# Patient Record
Sex: FEMALE | Race: WHITE | Employment: OTHER | URBAN - METROPOLITAN AREA
[De-identification: names, ages, dates, MRNs, and addresses within clinical notes are randomized per-mention and may not be internally consistent; named-entity substitution may affect disease eponyms.]

---

## 2017-02-23 ENCOUNTER — OFFICE VISIT (OUTPATIENT)
Dept: INTERNAL MEDICINE CLINIC | Age: 72
End: 2017-02-23

## 2017-02-23 VITALS
OXYGEN SATURATION: 98 % | HEIGHT: 59 IN | DIASTOLIC BLOOD PRESSURE: 69 MMHG | RESPIRATION RATE: 16 BRPM | TEMPERATURE: 98.1 F | SYSTOLIC BLOOD PRESSURE: 144 MMHG | WEIGHT: 179 LBS | BODY MASS INDEX: 36.08 KG/M2

## 2017-02-23 DIAGNOSIS — J40 BRONCHITIS: Primary | ICD-10-CM

## 2017-02-23 DIAGNOSIS — R05.9 COUGH: ICD-10-CM

## 2017-02-23 LAB
BASOPHILS # BLD AUTO: 0 X10E3/UL (ref 0–0.2)
BASOPHILS NFR BLD AUTO: 0 %
EOSINOPHIL # BLD AUTO: 0.1 X10E3/UL (ref 0–0.4)
EOSINOPHIL NFR BLD AUTO: 2 %
ERYTHROCYTE [DISTWIDTH] IN BLOOD BY AUTOMATED COUNT: 14.1 % (ref 12.3–15.4)
HCT VFR BLD AUTO: 41.7 % (ref 34–46.6)
HGB BLD-MCNC: 14.7 G/DL (ref 11.1–15.9)
LYMPHOCYTES # BLD AUTO: 1.7 X10E3/UL (ref 0.7–3.1)
LYMPHOCYTES NFR BLD AUTO: 29 %
MCH RBC QN AUTO: 30.2 PG (ref 26.6–33)
MCHC RBC AUTO-ENTMCNC: 35.3 G/DL (ref 31.5–35.7)
MCV RBC AUTO: 86 FL (ref 79–97)
MONOCYTES # BLD AUTO: 0.8 X10E3/UL (ref 0.1–0.9)
MONOCYTES NFR BLD AUTO: 14 %
NEUTROPHILS # BLD AUTO: 3.1 X10E3/UL (ref 1.4–7)
NEUTROPHILS NFR BLD AUTO: 55 %
PLATELET # BLD AUTO: 149 X10E3/UL (ref 150–379)
QUICKVUE INFLUENZA TEST: NEGATIVE
RBC # BLD AUTO: 4.86 X10E6/UL (ref 3.77–5.28)
VALID INTERNAL CONTROL?: YES
WBC # BLD AUTO: 5.7 X10E3/UL (ref 3.4–10.8)

## 2017-02-23 RX ORDER — PREDNISONE 20 MG/1
TABLET ORAL
Qty: 10 TAB | Refills: 0 | Status: SHIPPED | OUTPATIENT
Start: 2017-02-23 | End: 2018-01-09 | Stop reason: ALTCHOICE

## 2017-02-23 RX ORDER — PREDNISONE 20 MG/1
TABLET ORAL
Qty: 10 TAB | Refills: 0 | Status: SHIPPED | OUTPATIENT
Start: 2017-02-23 | End: 2017-02-23

## 2017-02-23 NOTE — PROGRESS NOTES
Subjective:   Lazaro Somers is a 73 y.o. female who complains of congestion, sore throat, productive cough and chills for several days, unchanged since that time. She denies a history of shortness of breath and wheezing. Patient reports her  was sick with similar symptoms. No fever but has had some chills at times. Throat is scratchy. She thought maybe was her sinus at first.    Evaluation to date: none. Treatment to date: mucinex yesterday. Patient does not smoke cigarettes. Relevant PMH: No pertinent additional PMH. Patient Active Problem List    Diagnosis Date Noted    Left knee DJD 05/16/2016    Localized primary osteoarthritis of left lower leg 05/16/2016    Seasonal allergic rhinitis 04/05/2012    GERD (gastroesophageal reflux disease) 04/05/2012     Allergies   Allergen Reactions    Azithromycin Nausea and Vomiting    Codeine Itching    Demerol [Meperidine] Itching     hallucinations    Dilaudid [Hydromorphone] Other (comments)     Constipation/depression    Pcn [Penicillins] Itching and Nausea Only     Pt stated she took keflex in 1970's without any reactions    Percocet [Oxycodone-Acetaminophen] Itching     Pt willing to try hydrocodone    Percodan [Oxycodone Hcl-Oxycodone-Asa] Itching        Review of Systems  Pertinent items are noted in HPI. Objective:     Visit Vitals    /69    Temp 98.1 °F (36.7 °C) (Oral)    Resp 16    Ht 4' 11\" (1.499 m)    Wt 179 lb (81.2 kg)    SpO2 98%    BMI 36.15 kg/m2     General:  alert, cooperative, no distress   Eyes: negative   Ears: abnormal TM AD - bulging, abnormal TM AS - bulging   Sinuses: Normal paranasal sinuses without tenderness   Mouth:  abnormal findings: mild oropharyngeal erythema   Neck: no adenopathy. Heart: normal rate and regular rhythm, no murmurs noted. Lungs: clear to auscultation bilaterally   Abdomen: Not examined        Assessment/Plan:   viral upper respiratory illness  RTC prn.   Tai Dietrich was seen today for illness. Diagnoses and all orders for this visit:    Bronchitis  -     CBC WITH AUTOMATED DIFF  -     Discontinue: predniSONE (DELTASONE) 20 mg tablet; Take two tablets daily for 5 days  -     predniSONE (DELTASONE) 20 mg tablet; Take two tablets daily for 5 days    Cough  -     AMB POC RAPID INFLUENZA TEST    .patient to get a lab done. Will contact her with the results once back. Rest and fluids. Take medication as prescribed.

## 2017-02-27 ENCOUNTER — HOSPITAL ENCOUNTER (OUTPATIENT)
Dept: GENERAL RADIOLOGY | Age: 72
Discharge: HOME | End: 2017-02-27
Attending: PHYSICIAN ASSISTANT
Payer: COMMERCIAL

## 2017-02-27 ENCOUNTER — OFFICE VISIT (OUTPATIENT)
Dept: INTERNAL MEDICINE CLINIC | Age: 72
End: 2017-02-27

## 2017-02-27 VITALS
SYSTOLIC BLOOD PRESSURE: 134 MMHG | DIASTOLIC BLOOD PRESSURE: 57 MMHG | BODY MASS INDEX: 36.29 KG/M2 | RESPIRATION RATE: 20 BRPM | HEART RATE: 91 BPM | TEMPERATURE: 98.1 F | WEIGHT: 180 LBS | HEIGHT: 59 IN | OXYGEN SATURATION: 95 %

## 2017-02-27 DIAGNOSIS — R05.9 COUGH: Primary | ICD-10-CM

## 2017-02-27 DIAGNOSIS — R05.9 COUGH: ICD-10-CM

## 2017-02-27 PROCEDURE — 71020 XR CHEST PA LAT: CPT

## 2017-02-27 RX ORDER — OXYBUTYNIN CHLORIDE 5 MG/1
TABLET, EXTENDED RELEASE ORAL
COMMUNITY
Start: 2017-02-27 | End: 2018-01-09 | Stop reason: ALTCHOICE

## 2017-02-27 RX ORDER — LEVOFLOXACIN 500 MG/1
500 TABLET, FILM COATED ORAL DAILY
Qty: 10 TAB | Refills: 0 | Status: SHIPPED | OUTPATIENT
Start: 2017-02-27 | End: 2018-01-09 | Stop reason: ALTCHOICE

## 2017-02-27 RX ORDER — ALBUTEROL SULFATE 90 UG/1
1 AEROSOL, METERED RESPIRATORY (INHALATION)
Qty: 1 INHALER | Refills: 0 | Status: SHIPPED | OUTPATIENT
Start: 2017-02-27 | End: 2019-11-11 | Stop reason: ALTCHOICE

## 2017-02-27 RX ORDER — CHOLESTYRAMINE 4 G/9G
POWDER, FOR SUSPENSION ORAL
COMMUNITY
Start: 2017-02-26 | End: 2017-02-27 | Stop reason: SDUPTHER

## 2017-02-27 NOTE — LETTER
NOTIFICATION RETURN TO WORK / SCHOOL 
 
2/27/2017 2:06 PM 
 
Ms. Celeste Gallegos [address] [facility] [id number] To Whom It May Concern: 
 
Celeste Gallegos is currently under the care of [facility].. She will return to work/school on: March 1, 2017 If there are questions or concerns please have the patient contact our office.  
 
 
 
Sincerely, 
 
 
Fran Sinclair PA-C

## 2017-02-27 NOTE — PROGRESS NOTES
Please let the patient know chest xray is normal. Finish medication and if not better follow up with Dr. Srinivasa Purdy.  thanks

## 2017-02-27 NOTE — PROGRESS NOTES
Writer called patient to inform of Lab and Chest xray results per Sophy with instruction, patient verbalized understanding.

## 2017-02-27 NOTE — PATIENT INSTRUCTIONS
Cardiac Guard Activation    Thank you for requesting access to [facility]iac Guard. Please follow the instructions below to securely access and download your online medical record. [facility]iac Guard allows you to send messages to your doctor, view your test results, renew your prescriptions, schedule appointments, and more. How Do I Sign Up? 1. In your internet browser, go to [email or web address]  2. Click on the First Time User? Click Here link in the Sign In box. You will be redirect to the New Member Sign Up page. 3. Enter your Cardiac Guard Access Code exactly as it appears below. You will not need to use this code after youve completed the sign-up process. If you do not sign up before the expiration date, you must request a new code. Cardiac Guard Access Code: [email or web address]  Expires: 3/13/2017 11:45 AM (This is the date your Cardiac Guard access code will )    4. Enter the last four digits of your Social Security Number (xxxx) and Date of Birth (mm/dd/yyyy) as indicated and click Submit. You will be taken to the next sign-up page. 5. Create a Cardiac Guard ID. This will be your Cardiac Guard login ID and cannot be changed, so think of one that is secure and easy to remember. 6. Create a Cardiac Guard password. You can change your password at any time. 7. Enter your Password Reset Question and Answer. This can be used at a later time if you forget your password. 8. Enter your e-mail address. You will receive e-mail notification when new information is available in [id number]. 9. Click Sign Up. You can now view and download portions of your medical record. 10. Click the Download Summary menu link to download a portable copy of your medical information. Additional Information    If you have questions, please visit the Frequently Asked Questions section of the Cardiac Guard website at [email or web address] Remember, Cardiac Guard is NOT to be used for urgent needs. For medical emergencies, dial 911.

## 2017-02-27 NOTE — PROGRESS NOTES
Subjective:   Marcellus Forman is a 73 y.o. female who complains of productive cough and fever(?) for several days, unchanged since that time. She was seen here last week and was prescribed prednisone for bronchitis. She reports she took it for 2 days but stopped it because it was making her feel jittery and she could not sleep. Since then she has continued to cough. She reports the mucous is white and thick. Coughing makes her chest hurt. She believes she had a fever this weekend. Patient Active Problem List    Diagnosis Date Noted    Left knee DJD 05/16/2016    Localized primary osteoarthritis of left lower leg 05/16/2016    Seasonal allergic rhinitis 04/05/2012    GERD (gastroesophageal reflux disease) 04/05/2012     Allergies   Allergen Reactions    Azithromycin Nausea and Vomiting    Codeine Itching    Demerol [Meperidine] Itching     hallucinations    Dilaudid [Hydromorphone] Other (comments)     Constipation/depression    Pcn [Penicillins] Itching and Nausea Only     Pt stated she took keflex in 1970's without any reactions    Percocet [Oxycodone-Acetaminophen] Itching     Pt willing to try hydrocodone    Percodan [Oxycodone Hcl-Oxycodone-Asa] Itching        Review of Systems  Pertinent items are noted in HPI. Objective:     Visit Vitals    /57    Pulse 91    Temp 98.1 °F (36.7 °C) (Oral)    Resp 20    Ht 4' 11\" (1.499 m)    Wt 180 lb (81.6 kg)    SpO2 95%    BMI 36.36 kg/m2     General:  alert, cooperative, no distress   Eyes: negative   Ears: normal TM's and external ear canals AU   Sinuses: Normal paranasal sinuses without tenderness   Mouth:  abnormal findings: mild oropharyngeal erythema   Neck: no adenopathy. Heart: normal rate and regular rhythm, no murmurs noted. Lungs: Coarse breath sounds with mild wheeze   Abdomen: Not examined        Assessment/Plan:   bronchitis  Antibiotics per orders. RTC prn. Christian Barrera was seen today for cold symptoms.     Diagnoses and all orders for this visit:    Cough  -     levoFLOXacin (LEVAQUIN) 500 mg tablet; Take 1 Tab by mouth daily. -     XR CHEST PA LAT; Future  -     albuterol (PROVENTIL HFA, VENTOLIN HFA, PROAIR HFA) 90 mcg/actuation inhaler; Take 1 Puff by inhalation every six (6) hours as needed for Wheezing. .patient to take medication as prescribed. I would like for her to get a chest xray. She will need to see Dr. Barry Hernandez if not better.

## 2017-02-27 NOTE — PROGRESS NOTES
Reviewed record in preparation for visit and have obtained necessary documentation. Identified pt with two pt identifiers(name and ). Health Maintenance Due   Topic    Hepatitis C Screening     DTaP/Tdap/Td series (1 - Tdap)    BREAST CANCER SCRN MAMMOGRAM     ZOSTER VACCINE AGE 60>     GLAUCOMA SCREENING Q2Y     OSTEOPOROSIS SCREENING (DEXA)     Pneumococcal 65+ Low/Medium Risk (1 of 2 - PCV13)    MEDICARE YEARLY EXAM     INFLUENZA AGE 9 TO ADULT          Chief Complaint   Patient presents with    Cold Symptoms     1 week, Pain in chest when coughing, cough/white mucous, headache, sinus pressure, nasal/white, sore throat, feel run down,         Wt Readings from Last 3 Encounters:   17 180 lb (81.6 kg)   17 179 lb (81.2 kg)   16 180 lb (81.6 kg)     Temp Readings from Last 3 Encounters:   17 98.1 °F (36.7 °C) (Oral)   17 98.1 °F (36.7 °C) (Oral)   16 98.2 °F (36.8 °C) (Oral)     BP Readings from Last 3 Encounters:   17 134/57   17 144/69   16 138/63     Pulse Readings from Last 3 Encounters:   17 91   16 87   16 87           Learning Assessment:  :     Learning Assessment 4/15/2016   PRIMARY LEARNER Patient   HIGHEST LEVEL OF EDUCATION - PRIMARY LEARNER  GRADUATED HIGH SCHOOL OR GED   BARRIERS PRIMARY LEARNER NONE   CO-LEARNER CAREGIVER No   PRIMARY LANGUAGE ENGLISH    NEED No   LEARNER PREFERENCE PRIMARY DEMONSTRATION   ANSWERED BY patient   RELATIONSHIP SELF       Depression Screening:  :     PHQ 2 / 9, over the last two weeks 2017   Little interest or pleasure in doing things Not at all   Feeling down, depressed or hopeless Not at all   Total Score PHQ 2 0       Fall Risk Assessment:  :     Fall Risk Assessment, last 12 mths 2017   Able to walk? Yes   Fall in past 12 months? No       Abuse Screening:  :     Abuse Screening Questionnaire 4/15/2016   Do you ever feel afraid of your partner?  N   Are you in a relationship with someone who physically or mentally threatens you? N   Is it safe for you to go home? Y       Coordination of Care Questionnaire:  :     1) Have you been to an emergency room, urgent care clinic since your last visit? no   Hospitalized since your last visit? no             2) Have you seen or consulted any other health care providers outside of [address] [place] [address] since your last visit? no  (Include any pap smears or colon screenings in this section.)    3) Do you have an Advance Directive on file? no    4) Are you interested in receiving information on Advance Directives? NO      Patient is accompanied by self I have received verbal consent from Beacher Dakin to discuss any/all medical information while they are present in the room.

## 2018-01-09 ENCOUNTER — OFFICE VISIT (OUTPATIENT)
Dept: INTERNAL MEDICINE CLINIC | Age: 73
End: 2018-01-09

## 2018-01-09 VITALS
SYSTOLIC BLOOD PRESSURE: 121 MMHG | WEIGHT: 182 LBS | HEART RATE: 78 BPM | OXYGEN SATURATION: 97 % | RESPIRATION RATE: 20 BRPM | HEIGHT: 59 IN | TEMPERATURE: 97.8 F | DIASTOLIC BLOOD PRESSURE: 59 MMHG | BODY MASS INDEX: 36.69 KG/M2

## 2018-01-09 DIAGNOSIS — R05.9 COUGH: Primary | ICD-10-CM

## 2018-01-09 RX ORDER — LEVOFLOXACIN 500 MG/1
500 TABLET, FILM COATED ORAL DAILY
Qty: 10 TAB | Refills: 0 | Status: SHIPPED | OUTPATIENT
Start: 2018-01-09 | End: 2019-11-11 | Stop reason: ALTCHOICE

## 2018-01-09 RX ORDER — HYDROCODONE POLISTIREX AND CHLORPHENIRAMINE POLISTIREX 10; 8 MG/5ML; MG/5ML
1 SUSPENSION, EXTENDED RELEASE ORAL
Qty: 50 ML | Refills: 0 | Status: SHIPPED | OUTPATIENT
Start: 2018-01-09 | End: 2019-11-11 | Stop reason: ALTCHOICE

## 2018-01-09 RX ORDER — MONTELUKAST SODIUM 4 MG/1
1 TABLET, CHEWABLE ORAL DAILY
COMMUNITY
Start: 2018-01-08

## 2018-01-09 RX ORDER — OXYBUTYNIN CHLORIDE 10 MG/1
TABLET, EXTENDED RELEASE ORAL
Refills: 1 | COMMUNITY
Start: 2017-12-31 | End: 2019-11-11 | Stop reason: SDUPTHER

## 2018-01-09 NOTE — PROGRESS NOTES
Reviewed record in preparation for visit and have obtained necessary documentation. Identified pt with two pt identifiers(name and ). Health Maintenance Due   Topic    Hepatitis C Screening     DTaP/Tdap/Td series (1 - Tdap)    BREAST CANCER SCRN MAMMOGRAM     ZOSTER VACCINE AGE 60>     GLAUCOMA SCREENING Q2Y     OSTEOPOROSIS SCREENING (DEXA)     Pneumococcal 65+ Low/Medium Risk (1 of 2 - PCV13)    MEDICARE YEARLY EXAM     Influenza Age 5 to Adult          No chief complaint on file. Wt Readings from Last 3 Encounters:   18 182 lb (82.6 kg)   17 180 lb (81.6 kg)   17 179 lb (81.2 kg)     Temp Readings from Last 3 Encounters:   18 97.8 °F (36.6 °C) (Oral)   17 98.1 °F (36.7 °C) (Oral)   17 98.1 °F (36.7 °C) (Oral)     BP Readings from Last 3 Encounters:   17 134/57   17 144/69   16 138/63     Pulse Readings from Last 3 Encounters:   17 91   16 87   16 87           Learning Assessment:  :     Learning Assessment 2018 2017 4/15/2016   PRIMARY LEARNER Patient Patient Patient   HIGHEST LEVEL OF EDUCATION - PRIMARY LEARNER  - - GRADUATED HIGH SCHOOL OR GED   BARRIERS PRIMARY LEARNER - - NONE   CO-LEARNER CAREGIVER - - No   PRIMARY LANGUAGE ENGLISH ENGLISH ENGLISH    NEED - - No   LEARNER PREFERENCE PRIMARY DEMONSTRATION DEMONSTRATION DEMONSTRATION   ANSWERED BY self self patient   RELATIONSHIP SELF SELF SELF       Depression Screening:  :     PHQ over the last two weeks 2017   Little interest or pleasure in doing things Not at all   Feeling down, depressed or hopeless Not at all   Total Score PHQ 2 0       Fall Risk Assessment:  :     Fall Risk Assessment, last 12 mths 2017   Able to walk? Yes   Fall in past 12 months? No       Abuse Screening:  :     Abuse Screening Questionnaire 2018 2017 4/15/2016   Do you ever feel afraid of your partner?  N N N   Are you in a relationship with someone who physically or mentally threatens you? N N N   Is it safe for you to go home? Y Y Y       Coordination of Care Questionnaire:  :     1) Have you been to an emergency room, urgent care clinic since your last visit? no   Hospitalized since your last visit? no             2) Have you seen or consulted any other health care providers outside of [address] [place] [address] since your last visit? no  (Include any pap smears or colon screenings in this section.)    3) Do you have an Advance Directive on file? no    4) Are you interested in receiving information on Advance Directives? YES      Patient is accompanied by self I have received verbal consent from Mary Alvarez to discuss any/all medical information while they are present in the room.

## 2018-01-10 NOTE — PROGRESS NOTES
Subjective:   Helene Arnold is a 81 y.o. female who complains of post nasal drip, productive cough, fever and some wheeze with fatigue for this has been going on off and on since Thanksgiving. She thought she was getting better but then she got sick again. She believes she got it from her . She reports the cough is horrible and has been keeping her up. She did take some of her 's cough medication. (guaifenesin from the [place])  She denies a history of shortness of breath. Evaluation to date: none. Treatment to date: OTC products. Patient does not smoke cigarettes. Relevant PMH: No pertinent additional PMH. Patient Active Problem List    Diagnosis Date Noted    Left knee DJD 05/16/2016    Localized primary osteoarthritis of left lower leg 05/16/2016    Seasonal allergic rhinitis 04/05/2012    GERD (gastroesophageal reflux disease) 04/05/2012     Allergies   Allergen Reactions    Azithromycin Nausea and Vomiting    Codeine Itching    Demerol [Meperidine] Itching     hallucinations    Dilaudid [Hydromorphone] Other (comments)     Constipation/depression    Pcn [Penicillins] Itching and Nausea Only     Pt stated she took keflex in 1970's without any reactions    Percocet [Oxycodone-Acetaminophen] Itching     Pt willing to try hydrocodone    Percodan [Oxycodone Hcl-Oxycodone-Asa] Itching        Review of Systems  Pertinent items are noted in HPI. Objective:     Visit Vitals    /59    Pulse 78    Temp 97.8 °F (36.6 °C) (Oral)    Resp 20    Ht 4' 11\" (1.499 m)    Wt 182 lb (82.6 kg)    SpO2 97%    BMI 36.76 kg/m2     General:  alert, cooperative, no distress   Eyes: negative   Ears: normal TM's and external ear canals AU   Sinuses: Normal paranasal sinuses without tenderness   Mouth:  abnormal findings: mild oropharyngeal erythema   Neck: no adenopathy. Heart: normal rate and regular rhythm, no murmurs noted.     Lungs: clear to auscultation bilaterally, no current wheeze Abdomen: Not examined        Assessment/Plan:   cough  Antibiotics per orders. RTC prn. Diagnoses and all orders for this visit:    1. Cough  -     chlorpheniramine-HYDROcodone (TUSSIONEX) 10-8 mg/5 mL suspension; Take 5 mL by mouth every twelve (12) hours as needed for Cough or Congestion. Max Daily Amount: 10 mL. -     levoFLOXacin (LEVAQUIN) 500 mg tablet; Take 1 Tab by mouth daily. .patient to start medication as prescribed. She should follow up if not better. (patient states she can take hydrocodone, she has had in the past without problems) all this was discussed with the patient and she understands and agrees.

## 2018-04-05 ENCOUNTER — TELEPHONE (OUTPATIENT)
Dept: INTERNAL MEDICINE CLINIC | Age: 73
End: 2018-04-05

## 2019-11-11 ENCOUNTER — OFFICE VISIT (OUTPATIENT)
Dept: INTERNAL MEDICINE CLINIC | Age: 74
End: 2019-11-11

## 2019-11-11 ENCOUNTER — TELEPHONE (OUTPATIENT)
Dept: INTERNAL MEDICINE CLINIC | Age: 74
End: 2019-11-11

## 2019-11-11 VITALS
RESPIRATION RATE: 20 BRPM | WEIGHT: 170 LBS | OXYGEN SATURATION: 98 % | BODY MASS INDEX: 34.27 KG/M2 | DIASTOLIC BLOOD PRESSURE: 67 MMHG | SYSTOLIC BLOOD PRESSURE: 134 MMHG | TEMPERATURE: 98.2 F | HEIGHT: 59 IN | HEART RATE: 79 BPM

## 2019-11-11 DIAGNOSIS — Z13.220 SCREENING FOR CHOLESTEROL LEVEL: ICD-10-CM

## 2019-11-11 DIAGNOSIS — Z01.818 PREOP EXAMINATION: ICD-10-CM

## 2019-11-11 DIAGNOSIS — Z23 ENCOUNTER FOR IMMUNIZATION: ICD-10-CM

## 2019-11-11 DIAGNOSIS — M25.50 ARTHRALGIA, UNSPECIFIED JOINT: Primary | ICD-10-CM

## 2019-11-11 DIAGNOSIS — Z11.59 ENCOUNTER FOR HEPATITIS C SCREENING TEST FOR LOW RISK PATIENT: ICD-10-CM

## 2019-11-11 RX ORDER — ESTRADIOL 1 MG/1
TABLET ORAL
Refills: 3 | COMMUNITY
Start: 2019-09-19

## 2019-11-11 RX ORDER — PANTOPRAZOLE SODIUM 40 MG/1
40 TABLET, DELAYED RELEASE ORAL DAILY
COMMUNITY
Start: 2019-09-23

## 2019-11-11 RX ORDER — OXYBUTYNIN CHLORIDE 5 MG/1
5 TABLET, EXTENDED RELEASE ORAL DAILY
COMMUNITY
Start: 2019-08-10

## 2019-11-11 NOTE — PROGRESS NOTES
Patient has not been her since 1/2018, should be new patient. Patient was set up as a [employer] and came in for Pre-Op visit. Writer has allowed more time to address all of these issues. Previous patient of Dr. Jorge Calderón. Patient is c/o arthritis pain and leg pain, writer explained to patient she may need to make another visit for the acute issues, patient verbalized understanding. Jim Rebolledo is a 72 y.o. female who presents for routine immunizations. She denies any symptoms , reactions or allergies that would exclude them from being immunized today. Risks and adverse reactions were discussed and the VIS was given to them. All questions were addressed. She was observed for 2 min post injection. There were no reactions observed.     Ander Dockery LPN

## 2019-11-11 NOTE — PROGRESS NOTES
Chief Complaint   Patient presents with   [address] Establish Care  Welcome To Medicare    Pre-op Exam     Cataract Exam Left Eye Dec 3     she is a 72y.o. year old female who presents for evaluation. Patient presents to the office today for a number of concerns as well as to have a preop exam done so that she may receive her cataract surgery on December 3. She reports that she has been having some pain of her hands. She reports that her sister was diagnosed with rheumatoid arthritis and currently has a rheumatologist that she would like to see as well. Ms. Leighton Ortega states that she does have a history of autoimmune disease. She states that she was treated for sarcoid many many years ago she was placed on steroids for a long period of time she reports she has not had problems with her sarcoid recently. She also has not history of arthritis and has had a knee replacement done of the left knee. She reports Dr. Vahid Levin at [facility] perform her knee surgery in 2016. She states she is now retired and she is taking care of her 9month old great Ramos today. She is really started to notice that she has trouble with gripping items with her hands. She is also started to notice swelling of her hands periodically. She reports that gripping causes her pain at times as well. Also while here today she would like to have an area of her inner thigh examined. She states she is noticed that when she gets out of the shower and she has her leg raise up she can feel that she believes to be a small knot in her right thigh. She reports this area does hurt sometimes but is usually when she is having leg. Ms. Monahan has varicose veins as well. She reports that she puts her topical gel on the area it does help her pain. Reviewed PmHx, RxHx, FmHx, SocHx, AllgHx and updated and dated in the chart.     Review of Systems - negative except as listed above    Objective:     Vitals:    11/11/19 1015   BP: 134/67 Pulse: 79   Resp: 20   Temp: 98.2 °F (36.8 °C)   TempSrc: Oral   SpO2: 98%   Weight: 170 lb (77.1 kg)   Height: 4' 11\" (1.499 m)     Physical Examination: General appearance - alert, well appearing, and in no distress  Eyes - pupils equal and reactive, extraocular eye movements intact  Ears - not able to visualize TM's due to cerumen  Nose - normal and patent, no erythema, discharge or polyps  Mouth - mucous membranes moist, pharynx normal without lesions  Neck - supple, no significant adenopathy  Chest - clear to auscultation, no wheezes, rales or rhonchi, symmetric air entry  Heart - normal rate and regular rhythm murmur heard  Abdomen - soft, nontender, nondistended, no masses or organomegaly  Musculoskeletal - bilateral hands. Mild herberdeen and jackson nodes noted  Extremities - no pedal edema noted, intact peripheral pulses  Right inner thigh- not able to appreciate mass or nodule. Assessment/ Plan:   Diagnoses and all orders for this visit:    1. Arthralgia, unspecified joint  -     METABOLIC PANEL, COMPREHENSIVE; Future  -     REFERRAL TO RHEUMATOLOGY    2. Encounter for immunization  -     diph,Pertuss,Acell,,Tet Vac-PF (ADACEL) 2 Lf-(2.5-5-3-5 mcg)-5Lf/0.5 mL susp; 0.5 mL by IntraMUSCular route once for 1 dose. -     INFLUENZA VIRUS VACCINE, HIGH DOSE SEASONAL, PRESERVATIVE FREE  -     ADMIN INFLUENZA VIRUS VAC    3. Screening for cholesterol level  -     LIPID PANEL; Future    4. Encounter for hepatitis C screening test for low risk patient  -     HEPATITIS C AB; Future       I have given the patient referral to see the rheumatologist that her sister sees. She is to call the office back to let me know the name of the rheumatologist as well as her appointment time. Today I have ordered a few labs. Ms. Wie Santos really has not seen anyone since the passing of Dr. Janice Santos. She will return in December to have her Medicare wellness. Today in the office she will receive her flu shot.   I have sent over a prescription for her to get her Tdap done. She will be contacted with these most recent labs once they return. I have discussed the diagnosis with the patient and the intended plan as seen in the above orders. The patient has received an after-visit summary and questions were answered concerning future plans. Medication Side Effects and Warnings were discussed with patient: yes  Patient Labs were reviewed and or requested: yes  Patient Past Records were reviewed and or requested  yes    Denny Sinclair PA-C                    Preoperative Evaluation    Date of Exam: 2019    All Vazquez is a 72 y.o. female (:1945) who presents for preoperative evaluation. Procedure/Surgery:cataract extraction with lens implant  Date of Procedure/Surgery: 12/3/59674  Surgeon: Dr. Lobo Hawk: [facility] ambulatory surgery center  Primary Physician: Josh Campbell PA-C  Latex Allergy: n    Medical History:     Past Medical History:   Diagnosis Date    Allergic rhinitis     Arthritis     Colon polyp     Common migraine     GERD (gastroesophageal reflux disease)     Hypertension     \"white coat syndrome\" per pt    Ill-defined condition     diverticulitis    Ischemic colitis, enteritis, or enterocolitis ([facility] [address]     Nausea & vomiting     Sarcoid      Allergies: Allergies   Allergen Reactions    Azithromycin Nausea and Vomiting    Codeine Itching    Demerol [Meperidine] Itching     hallucinations    Dilaudid [Hydromorphone] Other (comments)     Constipation/depression    Pcn [Penicillins] Itching and Nausea Only     Pt stated she took keflex in 1970's without any reactions    Percocet [Oxycodone-Acetaminophen] Itching     Pt willing to try hydrocodone    Percodan [Oxycodone Hcl-Oxycodone-Asa] Itching      Medications:     Current Outpatient Medications   Medication Sig    pantoprazole (PROTONIX) 40 mg tablet Take 40 mg by mouth daily.     oxybutynin chloride XL (DITROPAN XL) 5 mg CR tablet Take 5 mg by mouth daily.  estradiol (ESTRACE) 1 mg tablet TK 1 T PO QD    diph,Pertuss,Acell,,Tet Vac-PF (ADACEL) 2 Lf-(2.5-5-3-5 mcg)-5Lf/0.5 mL susp 0.5 mL by IntraMUSCular route once for 1 dose.  colestipol (COLESTID) 1 gram tablet Take 1 g by mouth daily.  fluticasone (FLONASE) 50 mcg/actuation nasal spray 2 Sprays by Both Nostrils route daily. No current facility-administered medications for this visit. Surgical History:     Past Surgical History:   Procedure Laterality Date    HX BREAST LUMPECTOMY Right 2015    HX CHOLECYSTECTOMY  1976    HX COLONOSCOPY  2015 2020    HX KNEE ARTHROSCOPY Right 18's    HX MALU AND BSO  1994    HX UROLOGICAL  2011    rebuild urethra, bladder tack     Social History:     Social History     Socioeconomic History    Marital status:      Spouse name: Not on file    Number of children: Not on file    Years of education: Not on file    Highest education level: Not on file   Tobacco Use    Smoking status: Never Smoker    Smokeless tobacco: Never Used   Substance and Sexual Activity    Alcohol use: Yes     Alcohol/week: 0.0 - 1.0 standard drinks     Comment: socially- maybe 1 drink/month    Drug use: No    Sexual activity: Yes     Partners: Male       Recent use of: No recent use of aspirin (ASA), NSAIDS or steroids    Tetanus up to date: Td vaccination indicated. (patient plans to get her tdap in another week)      Anesthesia Complications: None  History of abnormal bleeding : None      REVIEW OF SYSTEMS:  Pertinent items are noted in HPI.     EXAM:   Visit Vitals  /67   Pulse 79   Temp 98.2 °F (36.8 °C) (Oral)   Resp 20   Ht 4' 11\" (1.499 m)   Wt 170 lb (77.1 kg)   SpO2 98%   BMI 34.34 kg/m²     General appearance - alert, well appearing, and in no distress  Eyes - pupils equal and reactive, extraocular eye movements intact  Ears - not able to visualize TM's due to cerumen  Nose - normal and patent, no erythema, discharge or polyps  Mouth - mucous membranes moist, pharynx normal without lesions  Neck - supple, no significant adenopathy  Chest - clear to auscultation, no wheezes, rales or rhonchi, symmetric air entry  Heart - normal rate and regular rhythm murmur heard  Abdomen - soft, nontender, nondistended, no masses or organomegaly  Musculoskeletal - bilateral hands.  Mild herberdeen and jackson nodes noted  Extremities - no pedal edema noted, intact peripheral pulses        IMPRESSION:   arthritis  No contraindications to planned surgery    Sophy Sinclair PA-C   11/11/2019

## 2019-11-11 NOTE — PATIENT INSTRUCTIONS
Vaccine Information Statement    Influenza (Flu) Vaccine (Inactivated or Recombinant): What You Need to Know    Many Vaccine Information Statements are available in Malay and other languages. See www.immunize.org/vis  Hojas de información sobre vacunas están disponibles en español y en muchos otros idiomas. Visite www.immunize.org/vis    1. Why get vaccinated? Influenza vaccine can prevent influenza (flu). Flu is a contagious disease that spreads around the [facility] every year, usually between October and May. Anyone can get the flu, but it is more dangerous for some people. Infants and young children, people 72years of age and older, pregnant women, and people with certain health conditions or a weakened immune system are at greatest risk of flu complications. Pneumonia, bronchitis, sinus infections and ear infections are examples of flu-related complications. If you have a medical condition, such as heart disease, cancer or diabetes, flu can make it worse. Flu can cause fever and chills, sore throat, muscle aches, fatigue, cough, headache, and runny or stuffy nose. Some people may have vomiting and diarrhea, though this is more common in children than adults. Each year thousands of people in the [facility] die from flu, and many more are hospitalized. Flu vaccine prevents millions of illnesses and flu-related visits to the doctor each year. 2. Influenza vaccines     CDC recommends everyone 10months of age and older get vaccinated every flu season. Children 6 months through 6years of age may need 2 doses during a single flu season. Everyone else needs only 1 dose each flu season. It takes about 2 weeks for protection to develop after vaccination. There are many flu viruses, and they are always changing. Each year a new flu vaccine is made to protect against three or four viruses that are likely to cause disease in the upcoming flu season.  Even when the vaccine doesnt exactly match these viruses, it may still provide some protection. Influenza vaccine does not cause flu. Influenza vaccine may be given at the same time as other vaccines. 3. Talk with your health care provider    Tell your vaccine provider if the person getting the vaccine:   Has had an allergic reaction after a previous dose of influenza vaccine, or has any severe, life-threatening allergies.  Has ever had Guillain-Barré Syndrome (also called GBS). In some cases, your health care provider may decide to postpone influenza vaccination to a future visit. People with minor illnesses, such as a cold, may be vaccinated. People who are moderately or severely ill should usually wait until they recover before getting influenza vaccine. Your health care provider can give you more information. 4. Risks of a reaction     Soreness, redness, and swelling where shot is given, fever, muscle aches, and headache can happen after influenza vaccine.  There may be a very small increased risk of Guillain-Barré Syndrome (GBS) after inactivated influenza vaccine (the flu shot). [facility] children who get the flu shot along with pneumococcal vaccine (PCV13), and/or DTaP vaccine at the same time might be slightly more likely to have a seizure caused by fever. Tell your health care provider if a child who is getting flu vaccine has ever had a seizure. People sometimes faint after medical procedures, including vaccination. Tell your provider if you feel dizzy or have vision changes or ringing in the ears. As with any medicine, there is a very remote chance of a vaccine causing a severe allergic reaction, other serious injury, or death. 5. What if there is a serious problem? An allergic reaction could occur after the vaccinated person leaves the clinic.  If you see signs of a severe allergic reaction (hives, swelling of the face and throat, difficulty breathing, a fast heartbeat, dizziness, or weakness), call 9-1-1 and get the person to the nearest hospital.    For other signs that concern you, call your health care provider. Adverse reactions should be reported to the Vaccine Adverse Event Reporting System (VAERS). Your health care provider will usually file this report, or you can do it yourself. Visit the VAERS website at www.vaers. [facility].gov or call 7-105.217.5297. VAERS is only for reporting reactions, and VAERS staff do not give medical advice. 6. The National Vaccine Injury Compensation Program    The [facility] Vaccine Injury Compensation Program (VICP) is a federal program that was created to compensate people who may have been injured by certain vaccines. Visit the VICP website at www.[facility].gov/vaccinecompensation or call 9-714.976.7782 to learn about the program and about filing a claim. There is a time limit to file a claim for compensation. 7. How can I learn more?  Ask your health care provider.  Call your local or state health department.  Contact the Centers for Disease Control and Prevention (CDC):  - Call 6-455.171.6593 (1-800-CDC-INFO) or  - Visit CDCs influenza website at www.cdc.gov/flu    Vaccine Information Statement (Interim)  Inactivated Influenza Vaccine   8/15/2019  42 GAUDENCIO Dos Santos [id number]   Department of Health and Human Services  Centers for Disease Control and Prevention    Office Use Only

## 2020-01-15 ENCOUNTER — HOSPITAL ENCOUNTER (OUTPATIENT)
Dept: LAB | Age: 75
Discharge: HOME | End: 2020-01-15

## 2020-01-15 DIAGNOSIS — Z13.220 SCREENING FOR CHOLESTEROL LEVEL: ICD-10-CM

## 2020-01-15 DIAGNOSIS — M25.50 ARTHRALGIA, UNSPECIFIED JOINT: ICD-10-CM

## 2020-01-15 DIAGNOSIS — Z11.59 ENCOUNTER FOR HEPATITIS C SCREENING TEST FOR LOW RISK PATIENT: ICD-10-CM

## 2020-01-15 LAB
ALBUMIN SERPL-MCNC: 3.6 G/DL (ref 3.5–5)
ALBUMIN/GLOB SERPL: 1.2 {RATIO} (ref 1.1–2.2)
ALP SERPL-CCNC: 59 U/L (ref 45–117)
ALT SERPL-CCNC: 22 U/L (ref 12–78)
ANION GAP SERPL CALC-SCNC: 6 MMOL/L (ref 5–15)
AST SERPL-CCNC: 20 U/L (ref 15–37)
BILIRUB SERPL-MCNC: 0.4 MG/DL (ref 0.2–1)
BUN SERPL-MCNC: 20 MG/DL (ref 6–20)
BUN/CREAT SERPL: 35 (ref 12–20)
CALCIUM SERPL-MCNC: 8.5 MG/DL (ref 8.5–10.1)
CHLORIDE SERPL-SCNC: 108 MMOL/L (ref 97–108)
CHOLEST SERPL-MCNC: 230 MG/DL
CO2 SERPL-SCNC: 27 MMOL/L (ref 21–32)
CREAT SERPL-MCNC: 0.57 MG/DL (ref 0.55–1.02)
GLOBULIN SER CALC-MCNC: 2.9 G/DL (ref 2–4)
GLUCOSE SERPL-MCNC: 88 MG/DL (ref 65–100)
HCV AB SERPL QL IA: NONREACTIVE
HCV COMMENT,HCGAC: NORMAL
HDLC SERPL-MCNC: 56 MG/DL
HDLC SERPL: 4.1 {RATIO} (ref 0–5)
LDLC SERPL CALC-MCNC: 139.8 MG/DL (ref 0–100)
LIPID PROFILE,FLP: ABNORMAL
POTASSIUM SERPL-SCNC: 4.2 MMOL/L (ref 3.5–5.1)
PROT SERPL-MCNC: 6.5 G/DL (ref 6.4–8.2)
SODIUM SERPL-SCNC: 141 MMOL/L (ref 136–145)
TRIGL SERPL-MCNC: 171 MG/DL (ref ?–150)
VLDLC SERPL CALC-MCNC: 34.2 MG/DL

## 2020-01-16 NOTE — PROGRESS NOTES
Please let the patient know that her cholesterol is elevated. Comprehensive test was normal hepatitis C was nonreactive. Has she been on cholesterol medication in the past? I would be willing to have the patient really work on diet and increase physical activity to see if she can improve these numbers. This would mean eating foods high in omega three's, high fiber, limited fatty foods and foods his in saturated fats.  thanks

## 2020-01-17 NOTE — PROGRESS NOTES
Writer contacted patient to inform of lab results and inquiry with instruction per Marianna Cr, patient verbalized understanding. Patient has never been on cholesterol medication, will discuss further at [facility] on 1/21.

## 2020-01-21 ENCOUNTER — OFFICE VISIT (OUTPATIENT)
Dept: INTERNAL MEDICINE CLINIC | Age: 75
End: 2020-01-21

## 2020-01-21 VITALS
WEIGHT: 168 LBS | BODY MASS INDEX: 33.87 KG/M2 | TEMPERATURE: 97.9 F | RESPIRATION RATE: 20 BRPM | DIASTOLIC BLOOD PRESSURE: 67 MMHG | OXYGEN SATURATION: 99 % | SYSTOLIC BLOOD PRESSURE: 137 MMHG | HEART RATE: 76 BPM | HEIGHT: 59 IN

## 2020-01-21 DIAGNOSIS — Z23 ENCOUNTER FOR IMMUNIZATION: ICD-10-CM

## 2020-01-21 DIAGNOSIS — Z00.00 MEDICARE ANNUAL WELLNESS VISIT, SUBSEQUENT: Primary | ICD-10-CM

## 2020-01-21 DIAGNOSIS — Z13.820 OSTEOPOROSIS SCREENING: ICD-10-CM

## 2020-01-21 DIAGNOSIS — R01.1 MURMUR: ICD-10-CM

## 2020-01-21 DIAGNOSIS — E78.5 HYPERLIPIDEMIA, UNSPECIFIED HYPERLIPIDEMIA TYPE: ICD-10-CM

## 2020-01-21 RX ORDER — ACETAMINOPHEN 325 MG/1
650 TABLET ORAL
COMMUNITY

## 2020-01-21 RX ORDER — NAPROXEN SODIUM 220 MG
440 TABLET ORAL 2 TIMES DAILY WITH MEALS
COMMUNITY

## 2020-01-21 NOTE — PATIENT INSTRUCTIONS
Medicare Wellness Visit, Female The best way to live healthy is to have a lifestyle where you eat a well-balanced diet, exercise regularly, limit alcohol use, and quit all forms of tobacco/nicotine, if applicable. Regular preventive services are another way to keep healthy. Preventive services (vaccines, screening tests, monitoring & exams) can help personalize your care plan, which helps you manage your own care. Screening tests can find health problems at the earliest stages, when they are easiest to treat. [facility] follows the current, evidence-based guidelines published by the [facility] Jones ([facility]) when recommending preventive services for our patients. Because we follow these guidelines, sometimes recommendations change over time as research supports it. (For example, mammograms used to be recommended annually. Even though Medicare will still pay for an annual mammogram, the newer guidelines recommend a mammogram every two years for women of average risk). Of course, you and your doctor may decide to screen more often for some diseases, based on your risk and your co-morbidities (chronic disease you are already diagnosed with). Preventive services for you include: - Medicare offers their members a free annual wellness visit, which is time for you and your primary care provider to discuss and plan for your preventive service needs. Take advantage of this benefit every year! 
-All adults over the age of 72 should receive the recommended pneumonia vaccines. Current USPSTF guidelines recommend a series of two vaccines for the best pneumonia protection.  
-All adults should have a flu vaccine yearly and a tetanus vaccine every 10 years.  
-All adults age 48 and older should receive the shingles vaccines (series of two vaccines). -All adults age 38-68 who are overweight should have a diabetes screening test once every three years. -All adults born between [birth year] and 1963 should be screened once for Hepatitis C. 
-Other screening tests and preventive services for persons with diabetes include: an eye exam to screen for diabetic retinopathy, a kidney function test, a foot exam, and stricter control over your cholesterol.  
-Cardiovascular screening for adults with routine risk involves an electrocardiogram (ECG) at intervals determined by your doctor.  
-Colorectal cancer screenings should be done for adults age 54-65 with no increased risk factors for colorectal cancer. There are a number of acceptable methods of screening for this type of cancer. Each test has its own benefits and drawbacks. Discuss with your doctor what is most appropriate for you during your annual wellness visit. The different tests include: colonoscopy (considered the best screening method), a fecal occult blood test, a fecal DNA test, and sigmoidoscopy. 
 
-A bone mass density test is recommended when a woman turns 65 to screen for osteoporosis. This test is only recommended one time, as a screening. Some providers will use this same test as a disease monitoring tool if you already have osteoporosis. -Breast cancer screenings are recommended every other year for women of normal risk, age 54-69. 
-Cervical cancer screenings for women over age 72 are only recommended with certain risk factors. Here is a list of your current Health Maintenance items (your personalized list of preventive services) with a due date: 
Health Maintenance Due Topic Date Due  
 DTaP/Tdap/Td  (1 - Tdap) 12/04/1958  Shingles Vaccine (1 of 2) 12/04/1997  Mammogram  12/04/1997  Glaucoma Screening   12/04/2012  Bone Mineral Density   12/04/2012  Pneumococcal Vaccine (1 of 1 - PPSV23) 12/04/2012 Vaccine Information Statement Pneumococcal Conjugate Vaccine (PCV13): What You Need to Know Many Vaccine Information Statements are available in [place] and other languages. See www.immunize.org/vis Hojas de información sobre vacunas están disponibles en español y en muchos otros idiomas. Visite www.immunize.org/vis 1. Why get vaccinated? Pneumococcal conjugate vaccine (PCV13) can prevent pneumococcal disease. Pneumococcal disease refers to any illness caused by pneumococcal bacteria. These bacteria can cause many types of illnesses, including pneumonia, which is an infection of the lungs. Pneumococcal bacteria are one of the most common causes of pneumonia. Besides pneumonia, pneumococcal bacteria can also cause: 
 Ear infections  Sinus infections  Meningitis (infection of the tissue covering the brain and spinal cord)  Bacteremia (bloodstream infection) Anyone can get pneumococcal disease, but children under 3years of age, people with certain medical conditions, adults 72 years or older, and cigarette smokers are at the highest risk. Most pneumococcal infections are mild. However, some can result in long-term problems, such as brain damage or hearing loss. Meningitis, bacteremia, and pneumonia caused by pneumococcal disease can be fatal.  
 
2. PCV13 PCV13 protects against 13 types of bacteria that cause pneumococcal disease. Infants and young children usually need 4 doses of pneumococcal conjugate vaccine, at 2, 4, 6, and 1515 months of age. In some cases, a child might need fewer than 4 doses to complete PCV13 vaccination. A dose of PCV13 is also recommended for anyone 2 years or older with certain medical conditions if they did not already receive PCV13. This vaccine may be given to adults 72 years or older based on discussions between the patient and health care provider. 3. Talk with your health care provider Tell your vaccine provider if the person getting the vaccine: 
 Has had an allergic reaction after a previous dose of PCV13, to an earlier pneumococcal conjugate vaccine known as PCV7, or to any vaccine containing diphtheria toxoid (for example, DTaP), or has any severe, life-threatening allergies. In some cases, your health care provider may decide to postpone PCV13 vaccination to a future visit. People with minor illnesses, such as a cold, may be vaccinated. People who are moderately or severely ill should usually wait until they recover before getting PCV13. Your health care provider can give you more information. 4. Risks of a vaccine reaction  Redness, swelling, pain, or tenderness where the shot is given, and fever, loss of appetite, fussiness (irritability), feeling tired, headache, and chills can happen after PCV13. Deandre Bonds children may be at increased risk for seizures caused by fever after PCV13 if it is administered at the same time as inactivated influenza vaccine. Ask your health care provider for more information. People sometimes faint after medical procedures, including vaccination. Tell your provider if you feel dizzy or have vision changes or ringing in the ears. As with any medicine, there is a very remote chance of a vaccine causing a severe allergic reaction, other serious injury, or death. 5. What if there is a serious problem? An allergic reaction could occur after the vaccinated person leaves the clinic. If you see signs of a severe allergic reaction (hives, swelling of the face and throat, difficulty breathing, a fast heartbeat, dizziness, or weakness), call 9-1-1 and get the person to the nearest hospital. 
 
For other signs that concern you, call your health care provider. Adverse reactions should be reported to the Vaccine Adverse Event Reporting System (VAERS). Your health care provider will usually file this report, or you can do it yourself. Visit the VAERS website at www.vaers. hhs.gov or call 9-556.502.7269. VAERS is only for reporting reactions, and VAERS staff do not give medical advice.  
 
6. The National Vaccine Injury [facility] [facility] 
 
 The [place] [employer] Injury Compensation Program (VICP) is a federal program that was created to compensate people who may have been injured by certain vaccines. Visit the VICP website at www.[facility].gov/vaccinecompensation or call 3-352.993.3218 to learn about the program and about filing a claim. There is a time limit to file a claim for compensation. 7. How can I learn more?  Ask your health care provider.  Call your local or state health department.  Contact the Centers for Disease Control and Prevention (CDC): 
- Call 9-409.912.5567 (1-800-CDC-INFO) or 
- Visit CDCs website at www.cdc.gov/vaccines Vaccine Information Statement (Interim) PCV13  
10/30/2019 
42 GAUDENCIO Juares [id number] Department of Health and [employer] Centers for Disease Control and Prevention Office Use Only

## 2020-01-21 NOTE — PROGRESS NOTES
Patient on omeprazole 20mg 1 daily, until her protonix comes in. Last Eye Exam/VEI. Riccardo Richards is a 69 y.o. female who presents for routine immunizations. She denies any symptoms , reactions or allergies that would exclude them from being immunized today. Risks and adverse reactions were discussed and the VIS was given to them. All questions were addressed. She was observed for 2 min post injection. There were no reactions observed.     Riana Mesa LPN

## 2020-01-21 NOTE — PROGRESS NOTES
This is the Subsequent Medicare Annual Wellness Exam, performed 12 months or more after the Initial AWV or the last Subsequent AWV    I have reviewed the patient's medical history in detail and updated the computerized patient record. History     Patient Active Problem List   Diagnosis Code    Seasonal allergic rhinitis J30.2    GERD (gastroesophageal reflux disease) K21.9    Left knee DJD M17.12    Localized primary osteoarthritis of left lower leg M17.12     Past Medical History:   Diagnosis Date    Allergic rhinitis     Arthritis     Colon polyp     Common migraine     GERD (gastroesophageal reflux disease)     Hypertension     \"white coat syndrome\" per pt    Ill-defined condition     diverticulitis    Ischemic colitis, enteritis, or enterocolitis ([facility] [address]     Nausea & vomiting     Sarcoid       Past Surgical History:   Procedure Laterality Date    HX BREAST LUMPECTOMY Right 2015    HX CHOLECYSTECTOMY  1976    HX COLONOSCOPY  2015 2020    HX KNEE ARTHROSCOPY Right 1980's    HX MALU AND BSO  1994    HX UROLOGICAL  2011    rebuild urethra, bladder tack     Current Outpatient Medications   Medication Sig Dispense Refill    naproxen sodium (NAPROSYN) 220 mg tablet Take 440 mg by mouth two (2) times daily (with meals).  aspirin-acetaminophen-caffeine (EXCEDRIN MIGRAINE) 250-250-65 mg per tablet Take 1 Tab by mouth daily as needed for Headache.  acetaminophen (TYLENOL) 325 mg tablet Take 650 mg by mouth two (2) times daily as needed for Pain.  mv-mn/om3/dha/epa/fsh/flx/lact (DRY EYE FORMULA PO) Take 2 Drops by mouth four (4) times daily as needed (Dry Eye).  calcium carbonate-simethicone (BECCA-SELTZER HEARTBURN+GAS) 750-80 mg chew Take 1 Tab by mouth nightly as needed.  OTHER,NON-FORMULARY, two (2) times a day. Theraworx      OTHER,NON-FORMULARY, daily as needed.  Arnicare Gel      varicella-zoster recombinant, PF, (SHINGRIX, PF,) 50 mcg/0.5 mL susr injection 0.5 mL by IntraMUSCular route once for 1 dose. Repeat one in 2-6 months 0.5 mL 1    oxybutynin chloride XL (DITROPAN XL) 5 mg CR tablet Take 5 mg by mouth daily.  estradiol (ESTRACE) 1 mg tablet TK 1 T PO QD  3    colestipol (COLESTID) 1 gram tablet Take 1 g by mouth daily.  fluticasone (FLONASE) 50 mcg/actuation nasal spray 2 Sprays by Both Nostrils route daily. 3 Bottle 1    pantoprazole (PROTONIX) 40 mg tablet Take 40 mg by mouth daily. Allergies   Allergen Reactions    Azithromycin Nausea and Vomiting    Codeine Itching    Demerol [Meperidine] Itching     hallucinations    Dilaudid [Hydromorphone] Other (comments)     Constipation/depression    Pcn [Penicillins] Itching and Nausea Only     Pt stated she took keflex in 1970's without any reactions    Percocet [Oxycodone-Acetaminophen] Itching     Pt willing to try hydrocodone    Percodan [Oxycodone Hcl-Oxycodone-Asa] Itching       Family History   Problem Relation Age of Onset    Cancer Mother         colon    Hypertension Mother     Stroke Mother     Dementia Mother    Dameon Raymundo Delayed Awakening Mother     Cancer Father         lung    Other Sister         multiple allergies    Delayed Awakening Sister     No Known Problems Brother      Social History     Tobacco Use    Smoking status: Never Smoker    Smokeless tobacco: Never Used   Substance Use Topics    Alcohol use:  Yes     Alcohol/week: 0.0 - 1.0 standard drinks     Comment: socially- maybe 1 drink/month       Depression Risk Factor Screening:     3 most recent PHQ Screens 1/21/2020   Little interest or pleasure in doing things Not at all   Feeling down, depressed, irritable, or hopeless Not at all   Total Score PHQ 2 0       Alcohol Risk Factor Screening:   Do you average 1 drink per night or more than 7 drinks a week:  No    On any one occasion in the past three months have you have had more than 3 drinks containing alcohol:  No      Functional Ability and Level of Safety:   Hearing: left ear tone deafness, need a hearing aid. Activities of Daily Living: The home contains: handrails and taller toilet seats in the home. Patient does total self care    Ambulation: with no difficulty    Fall Risk:  Fall Risk Assessment, last 12 mths 11/11/2019   Able to walk? Yes   Fall in past 12 months? No       Abuse Screen:  Patient is not abused    Cognitive Screening   Has your family/caregiver stated any concerns about your memory: no  Cognitive Screening: Normal - testing not given    Patient Care Team   Patient Care Team:  Grace Roberson PA-C as PCP - General (Physician Assistant)  Grace Roberson PA-C as PCP - [facility]  Korin Torres MD (Ophthalmology)    Assessment/Plan   Education and counseling provided:  Are appropriate based on today's review and evaluation  Pneumococcal Vaccine  Screening Mammography  Bone mass measurement (DEXA)  Screening for glaucoma   Patient had glaucoma screening at [facility]. mammogram done at gyn office  Pneumonia vaccine to be given today. Order placed for bone density    Diagnoses and all orders for this visit:    1. Medicare annual wellness visit, subsequent    2. Murmur  -     ECHO ADULT COMPLETE; Future    3. Osteoporosis screening  -     DEXA BONE DENSITY STUDY AXIAL; Future    4. Encounter for immunization  -     varicella-zoster recombinant, PF, (SHINGRIX, PF,) 50 mcg/0.5 mL susr injection; 0.5 mL by IntraMUSCular route once for 1 dose. Repeat one in 2-6 months  -     PNEUMOCOCCAL CONJ VACCINE 13 VALENT IM  -     ADMIN INFLUENZA VIRUS VAC  -     ADMIN PNEUMOCOCCAL VACCINE    5. Hyperlipidemia, unspecified hyperlipidemia type     the patient will be contact to have her dexa scan appointment made.      Health Maintenance Due   Topic Date Due    DTaP/Tdap/Td series (1 - Tdap) 12/04/1958    Shingrix Vaccine Age 50> (1 of 2) 12/04/1997    BREAST CANCER SCRN MAMMOGRAM  12/04/1997    GLAUCOMA SCREENING Q2Y  12/04/2012    Bone Densitometry (Dexa) Screening  12/04/2012    Pneumococcal 65+ years (1 of 1 - PPSV23) 12/04/2012

## 2020-01-21 NOTE — PROGRESS NOTES
Chief Complaint   Patient presents with    Annual Wellness Visit     she is a 69y.o. year old female who presents for evaluation. Patient is here for her medicare wellness but would also like to discuss her labs as well as having a new murmur. She reports that she does like to eat livingston and she does cook with butter. She reports she has red meat about 2 times a week. She reports she has never been told that she had a murmur before. She does have a family history of hyperlipidemia and heart disease. She denies having shortness of breath or chest pain. Reviewed PmHx, RxHx, FmHx, SocHx, AllgHx and updated and dated in the chart. Review of Systems - negative except as listed above    Objective:     Vitals:    01/21/20 1103   BP: 137/67   Pulse: 76   Resp: 20   Temp: 97.9 °F (36.6 °C)   TempSrc: Oral   SpO2: 99%   Weight: 168 lb (76.2 kg)   Height: 4' 11\" (1.499 m)     Physical Examination: General appearance - alert, well appearing, and in no distress  Mental status - normal mood, behavior, speech, dress, motor activity, and thought processes  Heart - normal rate and regular rhythm, murmur noted    Assessment/ Plan:   Diagnoses and all orders for this visit:    1. Medicare annual wellness visit, subsequent    2. Murmur  -     ECHO ADULT COMPLETE; Future    3. Osteoporosis screening  -     DEXA BONE DENSITY STUDY AXIAL; Future    4. Encounter for immunization  -     varicella-zoster recombinant, PF, (SHINGRIX, PF,) 50 mcg/0.5 mL susr injection; 0.5 mL by IntraMUSCular route once for 1 dose. Repeat one in 2-6 months  -     PNEUMOCOCCAL CONJ VACCINE 13 VALENT IM  -     ADMIN INFLUENZA VIRUS VAC  -     ADMIN PNEUMOCOCCAL VACCINE       the patient and I spoke at length about diet changes that she could make that could help her cholesterol levels. We also talked today about getting an echo to follow up on the murmur. I explained to her that they would contact her to get this scheduled.  I would like for us to recheck her cholesterol in 6 months. She was given a print out about hyperlipidemia. I have discussed the diagnosis with the patient and the intended plan as seen in the above orders. The patient has received an after-visit summary and questions were answered concerning future plans.      Medication Side Effects and Warnings were discussed with patient: n/a  Patient Labs were reviewed and or requested: yes  Patient Past Records were reviewed and or requested  yes    Sophy Sinclair PA-C

## 2020-01-28 ENCOUNTER — TELEPHONE (OUTPATIENT)
Dept: INTERNAL MEDICINE CLINIC | Age: 75
End: 2020-01-28

## 2020-01-28 DIAGNOSIS — Z78.0 POST-MENOPAUSAL: Primary | ICD-10-CM

## 2020-01-28 DIAGNOSIS — Z13.820 OSTEOPOROSIS SCREENING: ICD-10-CM

## 2020-01-28 NOTE — TELEPHONE ENCOUNTER
----- Message from Jose Luis Din sent at 1/28/2020  9:39 AM EST -----  Regarding: Izabela Mathews,    This is the 2nd notice. We need a new order for Dexa scan the icd-10 code doesn't pass (F84.356), please send a new order or we will have to rescheduled patient until we get a correct order. You can't add a icd-10 code to the old order, because it still will not pass. You need a new order.         Thanks

## 2020-01-30 ENCOUNTER — HOSPITAL ENCOUNTER (OUTPATIENT)
Dept: NON INVASIVE DIAGNOSTICS | Age: 75
Discharge: HOME | End: 2020-01-30
Attending: PHYSICIAN ASSISTANT
Payer: MEDICARE

## 2020-01-30 ENCOUNTER — HOSPITAL ENCOUNTER (OUTPATIENT)
Dept: MAMMOGRAPHY | Age: 75
Discharge: HOME | End: 2020-01-30
Attending: PHYSICIAN ASSISTANT
Payer: MEDICARE

## 2020-01-30 VITALS
SYSTOLIC BLOOD PRESSURE: 153 MMHG | HEIGHT: 59 IN | WEIGHT: 167.99 LBS | DIASTOLIC BLOOD PRESSURE: 78 MMHG | BODY MASS INDEX: 33.87 KG/M2

## 2020-01-30 DIAGNOSIS — Z13.820 OSTEOPOROSIS SCREENING: ICD-10-CM

## 2020-01-30 DIAGNOSIS — Z78.0 POST-MENOPAUSAL: ICD-10-CM

## 2020-01-30 DIAGNOSIS — R01.1 MURMUR: ICD-10-CM

## 2020-01-30 LAB
AV VELOCITY RATIO: 0.55
ECHO AO ROOT DIAM: 2.49 CM
ECHO AV AREA PEAK VELOCITY: 1.7 CM2
ECHO AV AREA/BSA PEAK VELOCITY: 0.9 CM2/M2
ECHO AV PEAK GRADIENT: 10.6 MMHG
ECHO AV PEAK VELOCITY: 162.82 CM/S
ECHO EST RA PRESSURE: 3 MMHG
ECHO LA MAJOR AXIS: 3.86 CM
ECHO LA TO AORTIC ROOT RATIO: 1.55
ECHO LA VOL 2C: 54.07 ML (ref 22–52)
ECHO LA VOL 4C: 47.31 ML (ref 22–52)
ECHO LA VOL BP: 55.14 ML (ref 22–52)
ECHO LA VOL/BSA BIPLANE: 32.2 ML/M2 (ref 16–28)
ECHO LA VOLUME INDEX A2C: 31.58 ML/M2 (ref 16–28)
ECHO LA VOLUME INDEX A4C: 27.63 ML/M2 (ref 16–28)
ECHO LV EDV TEICHHOLZ: 48.98 ML
ECHO LV ESV TEICHHOLZ: 9.11 ML
ECHO LV INTERNAL DIMENSION DIASTOLIC: 4.4 CM (ref 3.9–5.3)
ECHO LV INTERNAL DIMENSION SYSTOLIC: 2.21 CM
ECHO LV IVSD: 0.83 CM (ref 0.6–0.9)
ECHO LV MASS 2D: 104.5 G (ref 67–162)
ECHO LV MASS INDEX 2D: 61 G/M2 (ref 43–95)
ECHO LV POSTERIOR WALL DIASTOLIC: 0.61 CM (ref 0.6–0.9)
ECHO LVOT DIAM: 1.97 CM
ECHO LVOT PEAK GRADIENT: 3.2 MMHG
ECHO LVOT PEAK VELOCITY: 89.66 CM/S
ECHO MV A VELOCITY: 121 CM/S
ECHO MV E DECELERATION TIME (DT): 249.3 MS
ECHO MV E VELOCITY: 100.24 CM/S
ECHO MV E/A RATIO: 0.83
ECHO MV REGURGITANT PEAK GRADIENT: 104.1 MMHG
ECHO MV REGURGITANT PEAK VELOCITY: 510.18 CM/S
ECHO PULMONARY ARTERY SYSTOLIC PRESSURE (PASP): 29.9 MMHG
ECHO PV MAX VELOCITY: 93.46 CM/S
ECHO PV PEAK GRADIENT: 3.5 MMHG
ECHO RIGHT VENTRICULAR SYSTOLIC PRESSURE (RVSP): 29.9 MMHG
ECHO RV INTERNAL DIMENSION: 3.22 CM
ECHO TV REGURGITANT MAX VELOCITY: 259.13 CM/S
ECHO TV REGURGITANT PEAK GRADIENT: 26.9 MMHG
LVFS 2D: 49.87 %
LVSV (TEICH): 39.86 ML
MV DEC SLOPE: 4.02

## 2020-01-30 PROCEDURE — 93306 TTE W/DOPPLER COMPLETE: CPT

## 2020-01-30 PROCEDURE — 77080 DXA BONE DENSITY AXIAL: CPT

## 2020-01-31 NOTE — PROGRESS NOTES
Please let the patient know she is osteopenic. Advise the patient to take 800 units of vitamin d and 1200 mg of calcium. Weightbearing physical activity does help as well. (brisk walking for 30 minutes most days of the week. We will check bone density in 2 years.  thanks

## 2020-02-03 NOTE — PROGRESS NOTES
Please let the patient know I got back her Echo. Let the patient know she has mild regurgitation of the mitral, and tricuspid valve. Mild aortic stenosis noted. Mild ventricular diastolic dysfunction. All these findings were discussed with Dr. Gauri Murrell. We will check this to make sure no changes in a year.

## 2020-02-03 NOTE — PROGRESS NOTES
Writer contacted patient to inform of dexa scan and instruction per Sung Yu, patient verbalized understanding.

## 2020-02-03 NOTE — PROGRESS NOTES
Writer contacted patient to inform of Echo results and further instruction per Jerad Benedict, patient verbalized understanding.

## 2020-05-01 ENCOUNTER — HOSPITAL ENCOUNTER (OUTPATIENT)
Dept: GENERAL RADIOLOGY | Age: 75
Discharge: HOME | End: 2020-05-01
Attending: INTERNAL MEDICINE
Payer: MEDICARE

## 2020-05-01 DIAGNOSIS — K51.90 ULCERATIVE COLITIS (HCC): ICD-10-CM

## 2020-05-01 PROCEDURE — 73130 X-RAY EXAM OF HAND: CPT

## 2020-05-14 ENCOUNTER — TELEPHONE (OUTPATIENT)
Dept: INTERNAL MEDICINE CLINIC | Age: 75
End: 2020-05-14

## 2020-05-14 DIAGNOSIS — Z29.9 PROPHYLACTIC MEASURE: Primary | ICD-10-CM

## 2020-05-14 RX ORDER — CLINDAMYCIN HYDROCHLORIDE 300 MG/1
600 CAPSULE ORAL ONCE
Qty: 2 CAP | Refills: 0 | Status: SHIPPED | OUTPATIENT
Start: 2020-05-14 | End: 2020-05-14

## 2020-06-24 ENCOUNTER — TELEPHONE (OUTPATIENT)
Dept: INTERNAL MEDICINE CLINIC | Age: 75
End: 2020-06-24

## 2020-06-24 DIAGNOSIS — Z29.9 PROPHYLACTIC MEASURE: Primary | ICD-10-CM

## 2020-06-24 RX ORDER — CLINDAMYCIN HYDROCHLORIDE 300 MG/1
CAPSULE ORAL
Qty: 2 CAP | Refills: 0 | Status: SHIPPED | OUTPATIENT
Start: 2020-06-24

## 2020-07-21 ENCOUNTER — VIRTUAL VISIT (OUTPATIENT)
Dept: INTERNAL MEDICINE CLINIC | Age: 75
End: 2020-07-21

## 2020-07-21 DIAGNOSIS — R01.1 HEART MURMUR: ICD-10-CM

## 2020-07-21 DIAGNOSIS — E78.5 HYPERLIPIDEMIA, UNSPECIFIED HYPERLIPIDEMIA TYPE: Primary | ICD-10-CM

## 2020-07-21 DIAGNOSIS — M54.2 NECK PAIN: ICD-10-CM

## 2020-07-21 RX ORDER — CYCLOBENZAPRINE HCL 10 MG
10 TABLET ORAL
Qty: 14 TAB | Refills: 0 | Status: SHIPPED | OUTPATIENT
Start: 2020-07-21

## 2020-07-21 RX ORDER — DICLOFENAC SODIUM 10 MG/G
GEL TOPICAL 3 TIMES DAILY
COMMUNITY

## 2020-07-21 RX ORDER — MULTIVITAMIN
2 TABLET ORAL DAILY
COMMUNITY

## 2020-07-21 NOTE — PROGRESS NOTES
Discuss heart murmur, taking calcium/D, patient pulled something in her neck on Saturday. Using voltaren/arnicare.

## 2020-07-21 NOTE — PROGRESS NOTES
Sofia Flores is a 69 y.o. female who was seen by synchronous (real-time) audio-video technology on 7/21/2020 for Cholesterol Problem        Assessment & Plan:   Diagnoses and all orders for this visit:    1. Hyperlipidemia, unspecified hyperlipidemia type  -     METABOLIC PANEL, COMPREHENSIVE; Future  -     LIPID PANEL; Future    2. Heart murmur    3. Neck pain  -     cyclobenzaprine (FLEXERIL) 10 mg tablet; Take 1 Tab by mouth nightly as needed for Muscle Spasm(s). Patient has been prescribed Flexeril that she may take at night to help with the neck pain. Advised her that if pain and symptoms persist she should contact the office. Patient is to get labs done at some point and she will be contacted with these results. Advised her to continue to watch diet and exercise as much is tolerable. I spent at least 25 minutes on this visit with this established patient. 712  Subjective:   Patient presents for follow-up. She reports since the last visit she has been taking her calcium and exercise using weights. She reports this past weekend she was moving furniture and pulled a muscle along the left side of her neck. She reports that she has been using Voltaren, heating pad and Aleve at times. She reports that it is feeling better but she still can feel it and it seems to bother her more at night. She has been seeing her eye doctor to follow-up on some retinal problems. She will be seeing them again in about 6 months. She also shares that she has been having problems with arthritis and pain of her hands. Her doctor would like to try her on a new medication for this. The patient will be getting her mammogram done soon. She reports that it was initially postponed due to the COVID-19. The patient has declined shingles and tetanus vaccines right now. Patient reports she has been trying to watch her diet and has decreased the amount of living[place] that she was eating.   She reports since last visit she has lost a few pounds. Prior to Admission medications    Medication Sig Start Date End Date Taking? Authorizing Provider   diclofenac (VOLTAREN) 1 % gel three (3) times daily. Yes Provider, Historical   calcium-cholecalciferol, D3, (CALTRATE 600+D) tablet Take 2 Tabs by mouth daily. Yes Provider, Historical   cyclobenzaprine (FLEXERIL) 10 mg tablet Take 1 Tab by mouth nightly as needed for Muscle Spasm(s). 7/21/20  Yes Sophy Sinclair, AMY   naproxen sodium (NAPROSYN) 220 mg tablet Take 440 mg by mouth two (2) times daily (with meals). Yes Provider, Historical   aspirin-acetaminophen-caffeine (EXCEDRIN MIGRAINE) 250-250-65 mg per tablet Take 1 Tab by mouth daily as needed for Headache. Yes Provider, Historical   acetaminophen (TYLENOL) 325 mg tablet Take 650 mg by mouth two (2) times daily as needed for Pain. Yes Provider, Historical   mv-mn/om3/dha/epa/fsh/flx/lact (DRY EYE FORMULA PO) Take 2 Drops by mouth four (4) times daily as needed (Dry Eye). Yes Provider, Historical   calcium carbonate-simethicone (BECCA-SELTZER HEARTBURN+GAS) 750-80 mg chew Take 1 Tab by mouth nightly as needed. Yes Provider, Historical   OTHER,NON-FORMULARY, two (2) times a day. Theraworx   Yes Provider, Historical   OTHER,NON-FORMULARY, daily as needed. Arnicare Gel   Yes Provider, Historical   pantoprazole (PROTONIX) 40 mg tablet Take 40 mg by mouth daily. 9/23/19  Yes Provider, Historical   oxybutynin chloride XL (DITROPAN XL) 5 mg CR tablet Take 5 mg by mouth daily. 8/10/19  Yes Provider, Historical   estradiol (ESTRACE) 1 mg tablet TK 1 T PO QD 9/19/19  Yes Provider, Historical   colestipol (COLESTID) 1 gram tablet Take 1 g by mouth daily. 1/8/18  Yes Provider, Historical   fluticasone (FLONASE) 50 mcg/actuation nasal spray 2 Sprays by Both Nostrils route daily. 12/13/16  Yes Ton Wilson MD   clindamycin (CLEOCIN) 300 mg capsule Take two caps by mouth once 30 to 60 minutes before procedure.  6/24/20   Barbra Sinclair ABDIEL, AMY     Patient Active Problem List    Diagnosis Date Noted    Left knee DJD 05/16/2016    Localized primary osteoarthritis of left lower leg 05/16/2016    Seasonal allergic rhinitis 04/05/2012    GERD (gastroesophageal reflux disease) 04/05/2012     Current Outpatient Medications   Medication Sig Dispense Refill    diclofenac (VOLTAREN) 1 % gel three (3) times daily.  calcium-cholecalciferol, D3, (CALTRATE 600+D) tablet Take 2 Tabs by mouth daily.  cyclobenzaprine (FLEXERIL) 10 mg tablet Take 1 Tab by mouth nightly as needed for Muscle Spasm(s). 14 Tab 0    naproxen sodium (NAPROSYN) 220 mg tablet Take 440 mg by mouth two (2) times daily (with meals).  aspirin-acetaminophen-caffeine (EXCEDRIN MIGRAINE) 250-250-65 mg per tablet Take 1 Tab by mouth daily as needed for Headache.  acetaminophen (TYLENOL) 325 mg tablet Take 650 mg by mouth two (2) times daily as needed for Pain.  mv-mn/om3/dha/epa/fsh/flx/lact (DRY EYE FORMULA PO) Take 2 Drops by mouth four (4) times daily as needed (Dry Eye).  calcium carbonate-simethicone (BECCA-SELTZER HEARTBURN+GAS) 750-80 mg chew Take 1 Tab by mouth nightly as needed.  OTHER,NON-FORMULARY, two (2) times a day. Theraworx      OTHER,NON-FORMULARY, daily as needed. Arnicare Gel      pantoprazole (PROTONIX) 40 mg tablet Take 40 mg by mouth daily.  oxybutynin chloride XL (DITROPAN XL) 5 mg CR tablet Take 5 mg by mouth daily.  estradiol (ESTRACE) 1 mg tablet TK 1 T PO QD  3    colestipol (COLESTID) 1 gram tablet Take 1 g by mouth daily.  fluticasone (FLONASE) 50 mcg/actuation nasal spray 2 Sprays by Both Nostrils route daily. 3 Bottle 1    clindamycin (CLEOCIN) 300 mg capsule Take two caps by mouth once 30 to 60 minutes before procedure.  2 Cap 0     Allergies   Allergen Reactions    Azithromycin Nausea and Vomiting    Codeine Itching    Demerol [Meperidine] Itching     hallucinations    Dilaudid [Hydromorphone] Other (comments)     Constipation/depression    Pcn [Penicillins] Itching and Nausea Only     Pt stated she took keflex in 1970's without any reactions    Percocet [Oxycodone-Acetaminophen] Itching     Pt willing to try hydrocodone    Percodan [Oxycodone Hcl-Oxycodone-Asa] Itching       ROS  See above  Objective:   No flowsheet data found. General: alert, cooperative, no distress   Mental  status: normal mood, behavior, speech, dress, motor activity, and thought processes, able to follow commands   HENT: NCAT   Neck: no visualized mass   Resp: no respiratory distress   Neuro: no gross deficits   Skin: no discoloration or lesions of concern on visible areas   Psychiatric: normal affect, consistent with stated mood, no evidence of hallucinations   Musculoskeletal cervical mild increased pain with range of motion. Mild tenderness to palpation along the left paravertebrals of C-spine down into the upper trapezius area. Additional exam findings: We discussed the expected course, resolution and complications of the diagnosis(es) in detail. Medication risks, benefits, costs, interactions, and alternatives were discussed as indicated. I advised her to contact the office if her condition worsens, changes or fails to improve as anticipated. She expressed understanding with the diagnosis(es) and plan. Nara Tabares, who was evaluated through a patient-initiated, synchronous (real-time) audio-video encounter, and/or her healthcare decision maker, is aware that it is a billable service, with coverage as determined by her insurance carrier. She provided verbal consent to proceed: Yes, and patient identification was verified. It was conducted pursuant to the emergency declaration under the [address] [place] [address] authority and the [employer] General Act. A caregiver was present when appropriate.  Ability to conduct physical exam was limited. I was at home. The patient was at home.       Roopa Sinclair PA-C

## 2020-11-30 ENCOUNTER — PATIENT MESSAGE (OUTPATIENT)
Dept: INTERNAL MEDICINE CLINIC | Age: 75
End: 2020-11-30

## 2021-03-08 ENCOUNTER — OFFICE VISIT (OUTPATIENT)
Dept: INTERNAL MEDICINE CLINIC | Age: 76
End: 2021-03-08
Payer: MEDICARE

## 2021-03-08 VITALS — HEART RATE: 80 BPM | DIASTOLIC BLOOD PRESSURE: 78 MMHG | SYSTOLIC BLOOD PRESSURE: 138 MMHG

## 2021-03-08 DIAGNOSIS — Z86.16 HISTORY OF COVID-19: ICD-10-CM

## 2021-03-08 DIAGNOSIS — R01.1 HEART MURMUR: Primary | ICD-10-CM

## 2021-03-08 DIAGNOSIS — F43.9 STRESS AT HOME: ICD-10-CM

## 2021-03-08 DIAGNOSIS — E78.5 HYPERLIPIDEMIA, UNSPECIFIED HYPERLIPIDEMIA TYPE: ICD-10-CM

## 2021-03-08 PROCEDURE — G8421 BMI NOT CALCULATED: HCPCS | Performed by: PHYSICIAN ASSISTANT

## 2021-03-08 PROCEDURE — G8536 NO DOC ELDER MAL SCRN: HCPCS | Performed by: PHYSICIAN ASSISTANT

## 2021-03-08 PROCEDURE — 3017F COLORECTAL CA SCREEN DOC REV: CPT | Performed by: PHYSICIAN ASSISTANT

## 2021-03-08 PROCEDURE — 1090F PRES/ABSN URINE INCON ASSESS: CPT | Performed by: PHYSICIAN ASSISTANT

## 2021-03-08 PROCEDURE — G8432 DEP SCR NOT DOC, RNG: HCPCS | Performed by: PHYSICIAN ASSISTANT

## 2021-03-08 PROCEDURE — 1101F PT FALLS ASSESS-DOCD LE1/YR: CPT | Performed by: PHYSICIAN ASSISTANT

## 2021-03-08 PROCEDURE — G8399 PT W/DXA RESULTS DOCUMENT: HCPCS | Performed by: PHYSICIAN ASSISTANT

## 2021-03-08 PROCEDURE — 99214 OFFICE O/P EST MOD 30 MIN: CPT | Performed by: PHYSICIAN ASSISTANT

## 2021-03-08 PROCEDURE — G8427 DOCREV CUR MEDS BY ELIG CLIN: HCPCS | Performed by: PHYSICIAN ASSISTANT

## 2021-03-08 NOTE — PROGRESS NOTES
1. Have you been to the ER, urgent care clinic since your last visit? Hospitalized since your last visit? No    2. Have you seen or consulted any other health care providers outside of the [address] [place] [address] since your last visit? Include any pap smears or colon screening. yes    Chief Complaint   Patient presents with    Heart Murmur     follow  up

## 2021-03-09 NOTE — PROGRESS NOTES
Chief Complaint   Patient presents with    Heart Murmur     follow  up     she is a 70y.o. year old female who presents for evaluation. Patient presents for follow-up. She reports that she has not been admitted to the office due to COVID-19. Patient reports that she was diagnosed with Covid back in December on the 23rd. She states that she lost taste and smell. Patient reports that she has been under a lot of stress recently with taking care of her  that also was diagnosed with COVID-19. She reports unfortunately he is now on oxygen as well. She also shares that her granddaughter that lives with her had to go and have emergency surgery due to a defect of her heart. The patient reports that she decided to come in today because she is noticed that a couple weeks ago she felt as though she could feel the murmur in her heart. The patient states she did not have shortness of breath or chest pain at that time. She is not sure if perhaps this is related to stress. Reviewed PmHx, RxHx, FmHx, SocHx, AllgHx and updated and dated in the chart. Review of Systems - negative except as listed above    Objective:     Vitals:    03/08/21 1540 03/08/21 1555   BP: (!) 141/63 138/78   Pulse: 80      Physical Examination: General appearance - alert, well appearing, and in no distress  Mental status - normal mood, behavior, speech, dress, motor activity, and thought processes  Chest - clear to auscultation, no wheezes, rales or rhonchi, symmetric air entry  Heart - normal rate and regular rhythm, mumur heard  Extremities - no pedal edema noted, intact peripheral pulses    Assessment/ Plan:   Diagnoses and all orders for this visit:    1. Heart murmur  -     CBC WITH AUTOMATED DIFF; Future  -     LIPID PANEL; Future  -     REFERRAL TO CARDIOLOGY    2. Hyperlipidemia, unspecified hyperlipidemia type  -     METABOLIC PANEL, COMPREHENSIVE; Future  -     CBC WITH AUTOMATED DIFF;  Future  -     LIPID PANEL; Future    3. Stress at home    4. History of COVID-19    Patient had EKG done in the office today. No acute changes was noted on EKG. Patient is to get lab work done. She has been given a referral to contact the cardiologist for follow-up. If cardiologist I suspect the patient may have been experiencing palpitations that was related to stress. This is something that we can address after she is finished with her evaluation. Total encounter time was 30 minutes; over 50% of the time was spent counseling and coordinating care regarding heart murmur, stress/anxiety and follow up wit cardilogist  I have discussed the diagnosis with the patient and the intended plan as seen in the above orders. The patient has received an after-visit summary and questions were answered concerning future plans.      Medication Side Effects and Warnings were discussed with patient: yes and n/a  Patient Labs were reviewed and or requested: yes  Patient Past Records were reviewed and or requested  yes    Sophy Sinclair PA-C

## 2021-03-18 ENCOUNTER — OFFICE VISIT (OUTPATIENT)
Dept: CARDIOLOGY CLINIC | Age: 76
End: 2021-03-18
Payer: MEDICARE

## 2021-03-18 ENCOUNTER — TELEPHONE (OUTPATIENT)
Dept: CARDIOLOGY CLINIC | Age: 76
End: 2021-03-18

## 2021-03-18 VITALS
SYSTOLIC BLOOD PRESSURE: 138 MMHG | OXYGEN SATURATION: 98 % | WEIGHT: 165 LBS | HEIGHT: 59 IN | BODY MASS INDEX: 33.26 KG/M2 | HEART RATE: 82 BPM | DIASTOLIC BLOOD PRESSURE: 80 MMHG

## 2021-03-18 DIAGNOSIS — R01.1 MURMUR: Primary | ICD-10-CM

## 2021-03-18 DIAGNOSIS — E78.00 ELEVATED CHOLESTEROL: ICD-10-CM

## 2021-03-18 PROCEDURE — 3017F COLORECTAL CA SCREEN DOC REV: CPT | Performed by: SPECIALIST

## 2021-03-18 PROCEDURE — 1090F PRES/ABSN URINE INCON ASSESS: CPT | Performed by: SPECIALIST

## 2021-03-18 PROCEDURE — G8399 PT W/DXA RESULTS DOCUMENT: HCPCS | Performed by: SPECIALIST

## 2021-03-18 PROCEDURE — G8432 DEP SCR NOT DOC, RNG: HCPCS | Performed by: SPECIALIST

## 2021-03-18 PROCEDURE — 99204 OFFICE O/P NEW MOD 45 MIN: CPT | Performed by: SPECIALIST

## 2021-03-18 PROCEDURE — 93000 ELECTROCARDIOGRAM COMPLETE: CPT | Performed by: SPECIALIST

## 2021-03-18 PROCEDURE — G8417 CALC BMI ABV UP PARAM F/U: HCPCS | Performed by: SPECIALIST

## 2021-03-18 PROCEDURE — 1101F PT FALLS ASSESS-DOCD LE1/YR: CPT | Performed by: SPECIALIST

## 2021-03-18 PROCEDURE — G8427 DOCREV CUR MEDS BY ELIG CLIN: HCPCS | Performed by: SPECIALIST

## 2021-03-18 PROCEDURE — G8536 NO DOC ELDER MAL SCRN: HCPCS | Performed by: SPECIALIST

## 2021-03-18 NOTE — PROGRESS NOTES
CARDIOLOGY OFFICE NOTE    Kip Siddiqui MD, [address], [place], [address] [facility]  Phone [phone]; Fax [phone]  Mobile [phone]   Voice Mail [phone]    LAST OFFICE VISIT : Visit date not found  Sophy Sinclair PA-C       ATTENTION:   This medical record was transcribed using an electronic medical records/speech recognition system. Although proofread, it may and can contain electronic, spelling and other errors. Corrections may be executed at a later time. Please feel free to contact us for any clarifications as needed. ICD-10-CM ICD-9-CM   1. Murmur  R01.1 785. [address] is a 70 y.o. female with  referred for palpitations. And dyslipidemia    . The patient denies chest pain/ shortness of breath, orthopnea, PND, LE edema, palpitations, syncope, presyncope or fatigue. Cardiac risk factors: dyslipidemia, post-menopausal  I have personally obtained the history from the patient. HISTORY OF PRESENTING ILLNESS    she is a 70y.o. year old female who presents for evaluation. She states she did have [employer] [place] [address] recently. Also states that she has been under some stress  She has noticed palpitations and will notice that mostly at rest at the end of the night. She denies any chest pain or shortness of breath. There have been no recent changes in any medicines.          ACTIVE PROBLEM LIST     Patient Active Problem List    Diagnosis Date Noted    Left knee DJD 05/16/2016    Localized primary osteoarthritis of left lower leg 05/16/2016    Seasonal allergic rhinitis 04/05/2012    GERD (gastroesophageal reflux disease) 04/05/2012           PAST MEDICAL HISTORY     Past Medical History:   Diagnosis Date    Allergic rhinitis     Arthritis     Colon polyp     Common migraine     GERD (gastroesophageal reflux disease)     Hypertension     \"white coat syndrome\" per pt    Ill-defined condition     diverticulitis    Ischemic colitis, enteritis, or enterocolitis ([facility] [address]     Nausea & vomiting     Sarcoid            PAST SURGICAL HISTORY     Past Surgical History:   Procedure Laterality Date    HX BREAST LUMPECTOMY Right 2015    HX CHOLECYSTECTOMY  1976    HX COLONOSCOPY  2015 2020    HX KNEE ARTHROSCOPY Right 18's    HX MALU AND BSO  1994    HX UROLOGICAL  2011    rebuild urethra, bladder tack          ALLERGIES     Allergies   Allergen Reactions    Azithromycin Nausea and Vomiting    Codeine Itching    Demerol [Meperidine] Itching     hallucinations    Dilaudid [Hydromorphone] Other (comments)     Constipation/depression    Pcn [Penicillins] Itching and Nausea Only     Pt stated she took keflex in 1970's without any reactions    Percocet [Oxycodone-Acetaminophen] Itching     Pt willing to try hydrocodone    Percodan [Oxycodone Hcl-Oxycodone-Asa] Itching          FAMILY HISTORY     Family History   Problem Relation Age of Onset    Cancer Mother         colon    Hypertension Mother     Stroke Mother     Dementia Mother     Delayed Awakening Mother     Cancer Father         lung    Other Sister         multiple allergies    Delayed Awakening Sister     No Known Problems Brother     negative for cardiac disease       SOCIAL HISTORY     Social History     Socioeconomic History    Marital status:      Spouse name: Not on file    Number of children: Not on file    Years of education: Not on file    Highest education level: Not on file   Tobacco Use    Smoking status: Never Smoker    Smokeless tobacco: Never Used   Substance and Sexual Activity    Alcohol use: Yes     Alcohol/week: 0.0 - 1.0 standard drinks     Comment: socially- maybe 1 drink/month    Drug use: No    Sexual activity: Yes     Partners: Male         MEDICATIONS     Current Outpatient Medications   Medication Sig    diclofenac (VOLTAREN) 1 % gel three (3) times daily.     calcium-cholecalciferol, D3, (CALTRATE 600+D) tablet Take 2 Tabs by mouth daily.  cyclobenzaprine (FLEXERIL) 10 mg tablet Take 1 Tab by mouth nightly as needed for Muscle Spasm(s).  clindamycin (CLEOCIN) 300 mg capsule Take two caps by mouth once 30 to 60 minutes before procedure.  naproxen sodium (NAPROSYN) 220 mg tablet Take 440 mg by mouth two (2) times daily (with meals).  aspirin-acetaminophen-caffeine (EXCEDRIN MIGRAINE) 250-250-65 mg per tablet Take 1 Tab by mouth daily as needed for Headache.  acetaminophen (TYLENOL) 325 mg tablet Take 650 mg by mouth two (2) times daily as needed for Pain.  mv-mn/om3/dha/epa/fsh/flx/lact (DRY EYE FORMULA PO) Take 2 Drops by mouth four (4) times daily as needed (Dry Eye).  calcium carbonate-simethicone (BECCA-SELTZER HEARTBURN+GAS) 750-80 mg chew Take 1 Tab by mouth nightly as needed.  OTHER,NON-FORMULARY, two (2) times a day. Theraworx    OTHER,NON-FORMULARY, daily as needed. Arnicare Gel    pantoprazole (PROTONIX) 40 mg tablet Take 40 mg by mouth daily.  oxybutynin chloride XL (DITROPAN XL) 5 mg CR tablet Take 5 mg by mouth daily.  estradiol (ESTRACE) 1 mg tablet TK 1 T PO QD    colestipol (COLESTID) 1 gram tablet Take 1 g by mouth daily.  fluticasone (FLONASE) 50 mcg/actuation nasal spray 2 Sprays by Both Nostrils route daily. No current facility-administered medications for this visit. I have reviewed the nurses notes, vitals, problem list, allergy list, medical history, family, social history and medications. REVIEW OF SYMPTOMS   Pertinent positives per HPI  General: Pt denies excessive weight gain or loss. Pt is able to conduct ADL's  HEENT: Denies blurred vision, headaches, hearing loss, epistaxis and difficulty swallowing. Respiratory: Denies cough, congestion, shortness of breath, STEEN, wheezing or stridor.   Cardiovascular: Denies precordial pain, palpitations, edema or PND  Gastrointestinal: Denies poor appetite, indigestion, abdominal pain or blood in stool  Genitourinary: Denies hematuria, dysuria, increased urinary frequency  Musculoskeletal: Denies joint pain or swelling from muscles or joints  Neurologic: Denies tremor, paresthesias, headache, or sensory motor disturbance  Psychiatric: Denies confusion, insomnia, depression  Integumentray: Denies rash, itching or ulcers. Hematologic: Denies easy bruising, bleeding     PHYSICAL EXAMINATION      Vitals:    03/18/21 1100   Weight: 165 lb (74.8 kg)   Height: 4' 11\" (1.499 m)     General: Well developed, in no acute distress. HEENT: No jaundice, oral mucosa moist, no oral ulcers  Neck: Supple, no stiffness, no lymphadenopathy, supple  Heart:  Normal S1/S2 negative S3 or S4. Regular, no murmur, gallop or rub, no jugular venous distention  Respiratory: Clear bilaterally x 4, no wheezing or rales  Abdomen:   Soft, non-tender, bowel sounds are active. Extremities:  No edema, normal cap refill, no cyanosis. Musculoskeletal: No clubbing, no deformities  Neuro: A&Ox3, speech clear, gait stable, cooperative, no focal neurologic deficits  Skin: Skin color is normal. No rashes or lesions. Non diaphoretic, moist.  Vascular: 2+ pulses symmetric in all extremities       EKG: Normal sinus rhythm     DIAGNOSTIC DATA     1. Echo   1/30/20-Ef 55%, LAE, Probably trileaflet aortic valve, mild AI, Mild mitral annular calcification, mild MR/TR    2.  Lipids  1/15/20- , HDL 56, .8,          LABORATORY DATA            Lab Results   Component Value Date/Time    WBC 5.7 02/23/2017 10:49 AM    HGB 14.7 02/23/2017 10:49 AM    HCT 41.7 02/23/2017 10:49 AM    PLATELET 750 (L) 52/12/5742 10:49 AM    MCV 86 02/23/2017 10:49 AM      Lab Results   Component Value Date/Time    Sodium 141 01/15/2020 12:58 PM    Potassium 4.2 01/15/2020 12:58 PM    Chloride 108 01/15/2020 12:58 PM    CO2 27 01/15/2020 12:58 PM    Anion gap 6 01/15/2020 12:58 PM    Glucose 88 01/15/2020 12:58 PM    BUN 20 01/15/2020 12:58 PM    Creatinine 0.57 01/15/2020 12:58 PM BUN/Creatinine ratio 35 (H) 01/15/2020 12:58 PM    GFR est AA >60 01/15/2020 12:58 PM    GFR est non-AA >60 01/15/2020 12:58 PM    Calcium 8.5 01/15/2020 12:58 PM    Bilirubin, total 0.4 01/15/2020 12:58 PM    Alk. phosphatase 59 01/15/2020 12:58 PM    Protein, total 6.5 01/15/2020 12:58 PM    Albumin 3.6 01/15/2020 12:58 PM    Globulin 2.9 01/15/2020 12:58 PM    A-G Ratio 1.2 01/15/2020 12:58 PM    ALT (SGPT) 22 01/15/2020 12:58 PM           ASSESSMENT/RECOMMENDATIONS:.      1.  Palpitations  -May be provoked by the stress that she has been under but I think we should go forward with a stress test to provoke any arrhythmias as well as a event loop monitor. 2.  Dyslipidemia her LDL is elevated I know her HDL is good but I think we should provide her information on calcium scoring and should her calcium score be elevated I be more likely to treat her underlying elevated LDL. We will also check NMR lipid particle size and particle number. 3.  Follow-up with me in 6 weeks or as needed    Orders Placed This Encounter    AMB POC EKG ROUTINE W/ 12 LEADS, INTER & REP     Order Specific Question:   Reason for Exam:     Answer:   murmur       We discussed the expected course, resolution and complications of the diagnosis(es) in detail. Medication risks, benefits, costs, interactions, and alternatives were discussed as indicated. I advised him to contact the office if his condition worsens, changes or fails to improve as anticipated. He expressed understanding with the diagnosis(es) and plan              I have discussed the diagnosis with  Anayeli Sharma and the intended plan as seen in the above orders. Questions were answered concerning future plans. I have discussed medication side effects and warnings with the patient as well. Thank you,  [place], Arlene Fabry, PA-C for involving me in the care of  Anayeli Shrama. Please do not hesitate to contact me for further questions/concerns. Kpi Swenson MD, [address]      [facility], [address] [place] [address]     [address]      [phone] / [phone] Fax

## 2021-03-18 NOTE — PATIENT INSTRUCTIONS
1) event loop recorder to watch for any irregular rhythms 2) stress test to see if we can provoke any arrhthymias 3) LDL is too high and would like you to have NMR at lab [facility] fasting 4) information on calcium score 5) carotid ultrasound for bruit

## 2021-03-18 NOTE — PROGRESS NOTES
Chad Cintron is a 70 y.o. female    Visit Vitals  /80 (BP 1 Location: Left upper arm, BP Patient Position: Sitting, BP Cuff Size: Adult)   Pulse 82   Ht 4' 11\" (1.499 m)   Wt 165 lb (74.8 kg)   SpO2 98%   BMI 33.33 kg/m²       Chief Complaint   Patient presents with    Heart Murmur       Chest pain no  SOB no  Dizziness no  Swelling no  Recent hospital visit no  Refills no  '

## 2021-03-25 ENCOUNTER — ANCILLARY PROCEDURE (OUTPATIENT)
Dept: CARDIOLOGY CLINIC | Age: 76
End: 2021-03-25

## 2021-03-25 ENCOUNTER — ANCILLARY PROCEDURE (OUTPATIENT)
Dept: CARDIOLOGY CLINIC | Age: 76
End: 2021-03-25
Payer: MEDICARE

## 2021-03-25 VITALS — BODY MASS INDEX: 31.61 KG/M2 | WEIGHT: 161 LBS | HEIGHT: 60 IN

## 2021-03-25 VITALS — HEIGHT: 60 IN | BODY MASS INDEX: 32.39 KG/M2 | WEIGHT: 165 LBS

## 2021-03-25 DIAGNOSIS — R01.1 MURMUR: ICD-10-CM

## 2021-03-25 LAB
STRESS BASELINE DIAS BP: 78 MMHG
STRESS BASELINE HR: 90 BPM
STRESS BASELINE SYS BP: 130 MMHG
STRESS ESTIMATED WORKLOAD: 7 METS
STRESS EXERCISE DUR MIN: NORMAL
STRESS O2 SAT PEAK: 97 %
STRESS O2 SAT REST: 97 %
STRESS PEAK DIAS BP: 88 MMHG
STRESS PEAK SYS BP: 166 MMHG
STRESS PERCENT HR ACHIEVED: 115 %
STRESS POST PEAK HR: 169 BPM
STRESS RATE PRESSURE PRODUCT: NORMAL BPM*MMHG
STRESS ST DEPRESSION: 0 MM
STRESS ST ELEVATION: 0 MM
STRESS TARGET HR: 147 BPM

## 2021-03-25 PROCEDURE — 93880 EXTRACRANIAL BILAT STUDY: CPT | Performed by: INTERNAL MEDICINE

## 2021-03-25 PROCEDURE — 93015 CV STRESS TEST SUPVJ I&R: CPT | Performed by: SPECIALIST

## 2021-03-29 DIAGNOSIS — E78.00 ELEVATED CHOLESTEROL: Primary | ICD-10-CM

## 2021-03-29 RX ORDER — SIMVASTATIN 10 MG/1
10 TABLET, FILM COATED ORAL
Qty: 30 TAB | Refills: 5 | OUTPATIENT
Start: 2021-03-29 | End: 2021-03-29 | Stop reason: SDUPTHER

## 2021-03-29 RX ORDER — SIMVASTATIN 10 MG/1
10 TABLET, FILM COATED ORAL
Qty: 30 TAB | Refills: 5 | OUTPATIENT
Start: 2021-03-29 | End: 2021-04-19 | Stop reason: SDUPTHER

## 2021-03-29 RX ORDER — SIMVASTATIN 10 MG/1
TABLET, FILM COATED ORAL
COMMUNITY
End: 2021-03-29 | Stop reason: SDUPTHER

## 2021-04-20 RX ORDER — SIMVASTATIN 10 MG/1
10 TABLET, FILM COATED ORAL
Qty: 30 TAB | Refills: 5 | Status: SHIPPED | OUTPATIENT
Start: 2021-04-20 | End: 2021-06-15

## 2021-04-22 ENCOUNTER — TELEPHONE (OUTPATIENT)
Dept: INTERNAL MEDICINE CLINIC | Age: 76
End: 2021-04-22

## 2021-04-22 DIAGNOSIS — Z12.31 ENCOUNTER FOR SCREENING MAMMOGRAM FOR BREAST CANCER: Primary | ICD-10-CM

## 2021-06-15 ENCOUNTER — OFFICE VISIT (OUTPATIENT)
Dept: CARDIOLOGY CLINIC | Age: 76
End: 2021-06-15
Payer: MEDICARE

## 2021-06-15 VITALS
SYSTOLIC BLOOD PRESSURE: 126 MMHG | HEART RATE: 76 BPM | BODY MASS INDEX: 32.59 KG/M2 | OXYGEN SATURATION: 99 % | DIASTOLIC BLOOD PRESSURE: 84 MMHG | WEIGHT: 166 LBS | HEIGHT: 60 IN

## 2021-06-15 DIAGNOSIS — R09.89 LEFT CAROTID BRUIT: ICD-10-CM

## 2021-06-15 DIAGNOSIS — R01.1 MURMUR: ICD-10-CM

## 2021-06-15 DIAGNOSIS — E78.00 ELEVATED CHOLESTEROL: Primary | ICD-10-CM

## 2021-06-15 PROCEDURE — G8417 CALC BMI ABV UP PARAM F/U: HCPCS | Performed by: SPECIALIST

## 2021-06-15 PROCEDURE — G8536 NO DOC ELDER MAL SCRN: HCPCS | Performed by: SPECIALIST

## 2021-06-15 PROCEDURE — G8399 PT W/DXA RESULTS DOCUMENT: HCPCS | Performed by: SPECIALIST

## 2021-06-15 PROCEDURE — 1101F PT FALLS ASSESS-DOCD LE1/YR: CPT | Performed by: SPECIALIST

## 2021-06-15 PROCEDURE — G8427 DOCREV CUR MEDS BY ELIG CLIN: HCPCS | Performed by: SPECIALIST

## 2021-06-15 PROCEDURE — 3017F COLORECTAL CA SCREEN DOC REV: CPT | Performed by: SPECIALIST

## 2021-06-15 PROCEDURE — 99214 OFFICE O/P EST MOD 30 MIN: CPT | Performed by: SPECIALIST

## 2021-06-15 PROCEDURE — G8510 SCR DEP NEG, NO PLAN REQD: HCPCS | Performed by: SPECIALIST

## 2021-06-15 PROCEDURE — 1090F PRES/ABSN URINE INCON ASSESS: CPT | Performed by: SPECIALIST

## 2021-06-15 PROCEDURE — G9899 SCRN MAM PERF RSLTS DOC: HCPCS | Performed by: SPECIALIST

## 2021-06-15 RX ORDER — ROSUVASTATIN CALCIUM 5 MG/1
5 TABLET, COATED ORAL
Qty: 15 TABLET | Refills: 5 | Status: SHIPPED | OUTPATIENT
Start: 2021-06-16 | End: 2022-01-12

## 2021-06-15 NOTE — PROGRESS NOTES
Room 2    Visit Vitals  /84 (BP 1 Location: Left upper arm, BP Patient Position: Sitting, BP Cuff Size: Adult)   Pulse 76   Ht 5' (1.524 m)   Wt 166 lb (75.3 kg)   SpO2 99%   BMI 32.42 kg/m²     STOPPED statin due to side effects  Loop didn't wear  Stress test  Carotid US    Chest pain: no  Shortness of breath: no  Edema: no  Palpitations: no  Dizziness: no    New diagnosis/Surgeries: no    ER/Hospitalizations: no    Refills: no

## 2021-06-15 NOTE — PROGRESS NOTES
CARDIOLOGY OFFICE NOTE    Kip Sprague MD, [address], [place], [address] [facility]  Phone [phone]; Fax [phone]  Mobile [phone]   Voice Mail [phone]    LAST OFFICE VISIT : Visit date not found  Sophy Sinclair PA-C       ATTENTION:   This medical record was transcribed using an electronic medical records/speech recognition system. Although proofread, it may and can contain electronic, spelling and other errors. Corrections may be executed at a later time. Please feel free to contact us for any clarifications as needed. ICD-10-CM ICD-9-CM   1. Elevated cholesterol  E78.00 272.0   2. Left carotid bruit  R09.89 785.9   3. Murmur  R01.1 [address]. [address] is a 70 y.o. female with  referred for palpitations. And dyslipidemia    . The patient denies chest pain/ shortness of breath, orthopnea, PND, LE edema, palpitations, syncope, presyncope or fatigue. Cardiac risk factors: dyslipidemia, post-menopausal  I have personally obtained the history from the patient. HISTORY OF PRESENTING ILLNESS    she is a 70y.o. year old female returns today for follow-up of her testing. She had a stress test that was normal she did not wear the Holter event monitor and says she has no further palpitations. She stopped her Zocor because of muscle aching and constipation. She did have Covid around the beginning of this year and she is recovered her  has been slowly recovering still requires oxygen.          ACTIVE PROBLEM LIST     Patient Active Problem List    Diagnosis Date Noted    Left knee DJD 05/16/2016    Localized primary osteoarthritis of left lower leg 05/16/2016    Seasonal allergic rhinitis 04/05/2012    GERD (gastroesophageal reflux disease) 04/05/2012           PAST MEDICAL HISTORY     Past Medical History:   Diagnosis Date    Allergic rhinitis     Arthritis     Colon polyp     Common migraine     GERD (gastroesophageal reflux disease)     Hypertension     \"white coat syndrome\" per pt    Ill-defined condition     diverticulitis    Ischemic colitis, enteritis, or enterocolitis ([address]     Nausea & vomiting     Sarcoid            PAST SURGICAL HISTORY     Past Surgical History:   Procedure Laterality Date    HX BREAST LUMPECTOMY Right 2015    HX CHOLECYSTECTOMY  1976    HX COLONOSCOPY  2015 2020    HX KNEE ARTHROSCOPY Right 1980's    HX MALU AND BSO  1994    HX UROLOGICAL  2011    rebuild urethra, bladder tack          ALLERGIES     Allergies   Allergen Reactions    Azithromycin Nausea and Vomiting    Codeine Itching    Demerol [Meperidine] Itching     hallucinations    Dilaudid [Hydromorphone] Other (comments)     Constipation/depression    Pcn [Penicillins] Itching and Nausea Only     Pt stated she took keflex in 1970's without any reactions    Percocet [Oxycodone-Acetaminophen] Itching     Pt willing to try hydrocodone    Percodan [Oxycodone Hcl-Oxycodone-Asa] Itching    Zocor [Simvastatin] Myalgia     Fatigue and constipated           FAMILY HISTORY     Family History   Problem Relation Age of Onset    Cancer Mother         colon    Hypertension Mother     Stroke Mother     Dementia Mother     Delayed Awakening Mother     Cancer Father         lung    Other Sister         multiple allergies    Delayed Awakening Sister     No Known Problems Brother     negative for cardiac disease       SOCIAL HISTORY     Social History     Socioeconomic History    Marital status:      Spouse name: Not on file    Number of children: Not on file    Years of education: Not on file    Highest education level: Not on file   Tobacco Use    Smoking status: Never Smoker    Smokeless tobacco: Never Used   Vaping Use    Vaping Use: Never used   Substance and Sexual Activity    Alcohol use: Yes     Alcohol/week: 0.0 - 1.0 standard drinks     Comment: socially- maybe 1 drink/month    Drug use:  No  Sexual activity: Yes     Partners: Male     Social Determinants of Health     Financial Resource Strain:     Difficulty of Paying Living Expenses:    Food Insecurity:     Worried About Running Out of Food in the Last Year:     [address] in the Last Year:    Transportation Needs:     Lack of Transportation (Medical):  Lack of Transportation (Non-Medical):    Physical Activity:     Days of Exercise per Week:     Minutes of Exercise per Session:    Stress:     Feeling of Stress :    Social Connections:     Frequency of Communication with Friends and Family:     Frequency of Social Gatherings with Friends and Family:     Attends [employer] Services:     Active Member of Clubs or Organizations:     Attends Club or Organization Meetings:     Marital Status:          MEDICATIONS     Current Outpatient Medications   Medication Sig    diclofenac (VOLTAREN) 1 % gel three (3) times daily.  calcium-cholecalciferol, D3, (CALTRATE 600+D) tablet Take 2 Tabs by mouth daily.  cyclobenzaprine (FLEXERIL) 10 mg tablet Take 1 Tab by mouth nightly as needed for Muscle Spasm(s).  clindamycin (CLEOCIN) 300 mg capsule Take two caps by mouth once 30 to 60 minutes before procedure. (Patient taking differently: Take two caps by mouth once 30 to 60 minutes before dental procedure. Hx knee implant.)    naproxen sodium (NAPROSYN) 220 mg tablet Take 440 mg by mouth two (2) times daily (with meals).  aspirin-acetaminophen-caffeine (EXCEDRIN MIGRAINE) 250-250-65 mg per tablet Take 1 Tab by mouth daily as needed for Headache.  acetaminophen (TYLENOL) 325 mg tablet Take 650 mg by mouth two (2) times daily as needed for Pain.  mv-mn/om3/dha/epa/fsh/flx/lact (DRY EYE FORMULA PO) Take 2 Drops by mouth four (4) times daily as needed (Dry Eye).  calcium carbonate-simethicone (BECCA-SELTZER HEARTBURN+GAS) 750-80 mg chew Take 1 Tab by mouth nightly as needed.  OTHER,NON-FORMULARY, two (2) times a day. Theraworx    OTHER,NON-FORMULARY, daily as needed. Arnicare Gel    pantoprazole (PROTONIX) 40 mg tablet Take 40 mg by mouth daily.  oxybutynin chloride XL (DITROPAN XL) 5 mg CR tablet Take 5 mg by mouth daily.  estradiol (ESTRACE) 1 mg tablet TK 1 T PO QD    colestipol (COLESTID) 1 gram tablet Take 1 g by mouth daily.  fluticasone (FLONASE) 50 mcg/actuation nasal spray 2 Sprays by Both Nostrils route daily.  simvastatin (ZOCOR) 10 mg tablet Take 1 Tab by mouth nightly. No current facility-administered medications for this visit. I have reviewed the nurses notes, vitals, problem list, allergy list, medical history, family, social history and medications. REVIEW OF SYMPTOMS   Pertinent positives per HPI  General: Pt denies excessive weight gain or loss. Pt is able to conduct ADL's  HEENT: Denies blurred vision, headaches, hearing loss, epistaxis and difficulty swallowing. Respiratory: Denies cough, congestion, shortness of breath, STEEN, wheezing or stridor. Cardiovascular: Denies precordial pain, palpitations, edema or PND  Gastrointestinal: Denies poor appetite, indigestion, abdominal pain or blood in stool  Genitourinary: Denies hematuria, dysuria, increased urinary frequency  Musculoskeletal: Denies joint pain or swelling from muscles or joints  Neurologic: Denies tremor, paresthesias, headache, or sensory motor disturbance  Psychiatric: Denies confusion, insomnia, depression  Integumentray: Denies rash, itching or ulcers. Hematologic: Denies easy bruising, bleeding     PHYSICAL EXAMINATION      Vitals:    06/15/21 1117   BP: 126/84   Pulse: 76   SpO2: 99%   Weight: 166 lb (75.3 kg)   Height: 5' (1.524 m)     General: Well developed, in no acute distress. HEENT: No jaundice, oral mucosa moist, no oral ulcers  Neck: Supple, no stiffness, no lymphadenopathy, supple  Heart:  Normal S1/S2 negative S3 or S4.  Regular, no murmur, gallop or rub, no jugular venous distention  Respiratory: Clear bilaterally x 4, no wheezing or rales  Extremities:  No edema, normal cap refill, no cyanosis. Musculoskeletal: No clubbing, no deformities  Neuro: A&Ox3, speech clear, gait stable, cooperative, no focal neurologic deficits  Skin: Skin color is normal. No rashes or lesions. Non diaphoretic, moist.         EKG: Normal sinus rhythm     DIAGNOSTIC DATA     1. Echo   1/30/20-Ef 55%, LAE, Probably trileaflet aortic valve, mild AI, Mild mitral annular calcification, mild MR/TR     2. Lipids   1/15/20- , HDL 56, .8,    3/25/21- , HDL 53, , LDL-P 2014,     3. Carotid Duplex   3/25/21-0-9% stenosis of the right internal carotid artery. 10-49% stenosis of the left internal carotid artery, falling closer to the lower end of this range. 4. Stress Test   Treadmill-3/25/21-normal, mets 7, 6 min         LABORATORY DATA            Lab Results   Component Value Date/Time    WBC 5.7 02/23/2017 10:49 AM    HGB 14.7 02/23/2017 10:49 AM    HCT 41.7 02/23/2017 10:49 AM    PLATELET 343 (L) 31/46/1559 10:49 AM    MCV 86 02/23/2017 10:49 AM      Lab Results   Component Value Date/Time    Sodium 141 01/15/2020 12:58 PM    Potassium 4.2 01/15/2020 12:58 PM    Chloride 108 01/15/2020 12:58 PM    CO2 27 01/15/2020 12:58 PM    Anion gap 6 01/15/2020 12:58 PM    Glucose 88 01/15/2020 12:58 PM    BUN 20 01/15/2020 12:58 PM    Creatinine 0.57 01/15/2020 12:58 PM    BUN/Creatinine ratio 35 (H) 01/15/2020 12:58 PM    GFR est AA >60 01/15/2020 12:58 PM    GFR est non-AA >60 01/15/2020 12:58 PM    Calcium 8.5 01/15/2020 12:58 PM    Bilirubin, total 0.4 01/15/2020 12:58 PM    Alk.  phosphatase 59 01/15/2020 12:58 PM    Protein, total 6.5 01/15/2020 12:58 PM    Albumin 3.6 01/15/2020 12:58 PM    Globulin 2.9 01/15/2020 12:58 PM    A-G Ratio 1.2 01/15/2020 12:58 PM    ALT (SGPT) 22 01/15/2020 12:58 PM           ASSESSMENT/RECOMMENDATIONS:.      1.  Palpitations  -She chose not to wear the loop monitor.  -Stress test was normal with normal perfusion  -No further episodes of palpitations  2. Dyslipidemia her LDL is elevated I know her HDL is good   -Again asked that she get calcium scoring but it is not mandatory that helpful in judging whether or not we should place her on a statin  -LDL is elevated so I am putting her on Crestor 5 mg Monday Wednesday and Friday and asked that she take coenzyme every 10 over-the-counter 200 mg. Recheck her cholesterol in 2 months. 3.  Follow-up with me in 6 months or as needed    No orders of the defined types were placed in this encounter. We discussed the expected course, resolution and complications of the diagnosis(es) in detail. Medication risks, benefits, costs, interactions, and alternatives were discussed as indicated. I advised him to contact the office if his condition worsens, changes or fails to improve as anticipated. He expressed understanding with the diagnosis(es) and plan          Follow-up and Dispositions  ·   Return in about 6 months (around 12/15/2021). I have discussed the diagnosis with  Mary Jo Perez and the intended plan as seen in the above orders. Questions were answered concerning future plans. I have discussed medication side effects and warnings with the patient as well. Thank you,  Catalina Sinclair PA-C for involving me in the care of  Mary Jo Perez. Please do not hesitate to contact me for further questions/concerns. Kip Ceron MD, [address] [place] [address] Rd[address]      [facility], [address] [place] [address] [address]      [phone] / [phone] Fax

## 2021-06-15 NOTE — PATIENT INSTRUCTIONS
1) information on calcium score 2) consider getting Coenzyme Q 10 200 mg over the counter daily 3) crestor 5 mg a Monday ,Wednesday and friday 4) in 2 mo check fasting cholesterol 5) return in 3 mo 6) NO RED MEAT

## 2022-01-14 RX ORDER — ROSUVASTATIN CALCIUM 5 MG/1
TABLET, COATED ORAL
Qty: 38 TABLET | OUTPATIENT
Start: 2022-01-14

## 2022-02-22 RX ORDER — ROSUVASTATIN CALCIUM 5 MG/1
TABLET, COATED ORAL
Qty: 15 TABLET | Refills: 0 | OUTPATIENT
Start: 2022-02-22

## 2022-02-23 ENCOUNTER — HOSPITAL ENCOUNTER (OUTPATIENT)
Dept: MAMMOGRAPHY | Age: 77
Discharge: HOME | End: 2022-02-23
Attending: PHYSICIAN ASSISTANT
Payer: MEDICARE

## 2022-02-23 DIAGNOSIS — Z12.31 ENCOUNTER FOR SCREENING MAMMOGRAM FOR BREAST CANCER: ICD-10-CM

## 2022-02-23 PROCEDURE — 77067 SCR MAMMO BI INCL CAD: CPT

## 2022-04-13 ENCOUNTER — TELEPHONE (OUTPATIENT)
Dept: PHARMACY | Age: 77
End: 2022-04-13

## 2022-04-13 NOTE — LETTER
[address]  [address], [address]        Ruth Spear   [address] [address]           04/14/22     Dear Ruth Spear,      We tried to reach you recently regarding your Rosuvastatin 5mg tablets, but were unable to reach you on the telephone. The last time your medication was filled was on 01/14/2022 for a 35 day supply. It appears that you are overdue for an appt with blank doctor. Refills will not be provided until you have scheduled an appt. please call your primary care doctor at [phone] and schedule an appt as soon as possible. We have on file that you are currently taking Rosuvastatin 5mg once daily. If you are no longer taking it or taking it differently than above, please call us at the number below so that we can discuss this and update your medication profile.     Some ways to help you remember to take and refill your medications are to:  · Use a pill box, set an alarm, and/or keep your medication near something that you do every day  · Fill a 3-month supply of your prescription at a time to save you time and trips to the pharmacy  if you would like assistance in switching your prescriptions to a 3-month supply, please contact us  · Ask your pharmacy if they participate in [facility]", a program where you can  all of your medications on the same day each month  · Ask your pharmacy if you can be set up with automatic refill, where they will automatically refill your prescription when it is due and let you know it's ready to     Sincerely,     [address]  Phone: [phone], Option 2

## 2022-04-13 NOTE — TELEPHONE ENCOUNTER
[facility] CLINICAL PHARMACY: ADHERENCE REVIEW  Identified care gap per [place]: fills at [facility]: Statin adherence    Last Visit: 09/15/2021    No LIS    Patient not found in [facility]    [address]  Per [place] Portal:   Rosuvastatin 5mg last filled on 01/14/2022 for 45 day supply. Per [facility]:   Rosuvastatin 5mg last picked up on 01/14/2022 for 45 day supply. 0 refills remaining. Billed through [facility] Rx Value Date/Time    Cholesterol, total 230 (H) 01/15/2020 12:58 PM    Cholesterol, Total 242 (H) 03/25/2021 09:19 AM    HDL Cholesterol 56 01/15/2020 12:58 PM    LDL, calculated 139.8 (H) 01/15/2020 12:58 PM    VLDL, calculated 34.2 01/15/2020 12:58 PM    Triglyceride 171 (H) 01/15/2020 12:58 PM    CHOL/HDL Ratio 4.1 01/15/2020 12:58 PM     ALT (SGPT)   Date Value Ref Range Status   01/15/2020 22 12 - 78 U/L Final     AST (SGOT)   Date Value Ref Range Status   01/15/2020 20 15 - 37 U/L Final     The 10-year ASCVD risk score (Alcides Gonzalez, et al., 2013) is: 14.4%    Values used to calculate the score:      Age: 78 years      Sex: Female      Is Non- : No      Diabetic: No      Tobacco smoker: No      Systolic Blood Pressure: 649 mmHg      Is BP treated: No      HDL Cholesterol: 53 mg/dL      Total Cholesterol: 242 mg/dL     PLAN  The following are interventions that have been identified:  - Patient overdue refilling Rosuvastatin  and active on home medication list     Per MD- Will not refill until patient makes an appointment and is seen. Attempting to reach patient to review.  Left message asking for return call. Called patient to inform her that she needs to make an appt before MD will continue to write refills for medication. No future appointments.     Noni Morrison  Direct: [phone]  Department, toll free:[phone], Option 2

## 2022-04-14 NOTE — TELEPHONE ENCOUNTER
No answer after 2 attempts. Sending letter for outreach.      For Pharmacy Admin Tracking Only     CPA in place: No   Gap Closed?: No   Time Spent (min): 20